# Patient Record
Sex: MALE | Race: WHITE | Employment: OTHER | URBAN - METROPOLITAN AREA
[De-identification: names, ages, dates, MRNs, and addresses within clinical notes are randomized per-mention and may not be internally consistent; named-entity substitution may affect disease eponyms.]

---

## 2017-03-15 ENCOUNTER — HOSPITAL ENCOUNTER (OUTPATIENT)
Dept: MRI IMAGING | Age: 67
Discharge: HOME | End: 2017-03-15
Attending: FAMILY MEDICINE
Payer: COMMERCIAL

## 2017-03-15 DIAGNOSIS — M54.10 RADICULAR PAIN OF RIGHT LOWER EXTREMITY: ICD-10-CM

## 2017-03-15 DIAGNOSIS — M51.36 LUMBAR DEGENERATIVE DISC DISEASE: ICD-10-CM

## 2017-03-15 PROCEDURE — 72148 MRI LUMBAR SPINE W/O DYE: CPT

## 2017-05-12 ENCOUNTER — HOSPITAL ENCOUNTER (OUTPATIENT)
Dept: MRI IMAGING | Age: 67
Discharge: HOME | End: 2017-05-12
Attending: ORTHOPAEDIC SURGERY
Payer: COMMERCIAL

## 2017-05-12 DIAGNOSIS — M75.21 BICIPITAL TENDINITIS OF SHOULDER, RIGHT: ICD-10-CM

## 2017-05-12 PROCEDURE — 73221 MRI JOINT UPR EXTREM W/O DYE: CPT

## 2017-06-28 ENCOUNTER — HOSPITAL ENCOUNTER (OUTPATIENT)
Dept: PREADMISSION TESTING | Age: 67
Discharge: HOME | End: 2017-06-28
Attending: ORTHOPAEDIC SURGERY
Payer: COMMERCIAL

## 2017-06-28 VITALS
BODY MASS INDEX: 27.11 KG/M2 | RESPIRATION RATE: 16 BRPM | HEART RATE: 65 BPM | DIASTOLIC BLOOD PRESSURE: 84 MMHG | WEIGHT: 200.18 LBS | SYSTOLIC BLOOD PRESSURE: 125 MMHG | HEIGHT: 72 IN | OXYGEN SATURATION: 96 % | TEMPERATURE: 98.1 F

## 2017-06-28 LAB
25(OH)D3 SERPL-MCNC: 22.7 NG/ML (ref 30–100)
ABO + RH BLD: NORMAL
ALBUMIN SERPL BCP-MCNC: 4.1 G/DL (ref 3.5–5)
ALBUMIN/GLOB SERPL: 1.4 {RATIO} (ref 1.1–2.2)
ALP SERPL-CCNC: 91 U/L (ref 45–117)
ALT SERPL-CCNC: 43 U/L (ref 12–78)
ANION GAP BLD CALC-SCNC: 8 MMOL/L (ref 5–15)
APPEARANCE UR: CLEAR
AST SERPL W P-5'-P-CCNC: 30 U/L (ref 15–37)
BACTERIA URNS QL MICRO: NEGATIVE /HPF
BILIRUB SERPL-MCNC: 0.6 MG/DL (ref 0.2–1)
BILIRUB UR QL: NEGATIVE
BLOOD GROUP ANTIBODIES SERPL: NORMAL
BUN SERPL-MCNC: 31 MG/DL (ref 6–20)
BUN/CREAT SERPL: 30 (ref 12–20)
CALCIUM SERPL-MCNC: 9.5 MG/DL (ref 8.5–10.1)
CHLORIDE SERPL-SCNC: 108 MMOL/L (ref 97–108)
CO2 SERPL-SCNC: 25 MMOL/L (ref 21–32)
COLOR UR: ABNORMAL
CREAT SERPL-MCNC: 1.02 MG/DL (ref 0.7–1.3)
EPITH CASTS URNS QL MICRO: ABNORMAL /LPF
EST. AVERAGE GLUCOSE BLD GHB EST-MCNC: 126 MG/DL
GLOBULIN SER CALC-MCNC: 2.9 G/DL (ref 2–4)
GLUCOSE SERPL-MCNC: 108 MG/DL (ref 65–100)
GLUCOSE UR STRIP.AUTO-MCNC: NEGATIVE MG/DL
HBA1C MFR BLD: 6 % (ref 4.2–6.3)
HGB UR QL STRIP: ABNORMAL
HYALINE CASTS URNS QL MICRO: ABNORMAL /LPF (ref 0–5)
INR PPP: 1.1 (ref 0.9–1.1)
KETONES UR QL STRIP.AUTO: NEGATIVE MG/DL
LEUKOCYTE ESTERASE UR QL STRIP.AUTO: NEGATIVE
NITRITE UR QL STRIP.AUTO: NEGATIVE
PH UR STRIP: 5.5 [PH] (ref 5–8)
POTASSIUM SERPL-SCNC: 4.1 MMOL/L (ref 3.5–5.1)
PREALB SERPL-MCNC: 28.9 MG/DL (ref 20–40)
PROT SERPL-MCNC: 7 G/DL (ref 6.4–8.2)
PROT UR STRIP-MCNC: NEGATIVE MG/DL
PROTHROMBIN TIME: 10.7 SEC (ref 9–11.1)
RBC #/AREA URNS HPF: ABNORMAL /HPF (ref 0–5)
SODIUM SERPL-SCNC: 141 MMOL/L (ref 136–145)
SP GR UR REFRACTOMETRY: 1.02 (ref 1–1.03)
SPECIMEN EXP DATE BLD: NORMAL
UA: UC IF INDICATED,UAUC: ABNORMAL
UROBILINOGEN UR QL STRIP.AUTO: 0.2 EU/DL (ref 0.2–1)
WBC URNS QL MICRO: ABNORMAL /HPF (ref 0–4)

## 2017-06-28 PROCEDURE — 36415 COLL VENOUS BLD VENIPUNCTURE: CPT | Performed by: ORTHOPAEDIC SURGERY

## 2017-06-28 PROCEDURE — 80053 COMPREHEN METABOLIC PANEL: CPT | Performed by: ORTHOPAEDIC SURGERY

## 2017-06-28 PROCEDURE — 83036 HEMOGLOBIN GLYCOSYLATED A1C: CPT | Performed by: ORTHOPAEDIC SURGERY

## 2017-06-28 PROCEDURE — 85610 PROTHROMBIN TIME: CPT | Performed by: ORTHOPAEDIC SURGERY

## 2017-06-28 PROCEDURE — 82306 VITAMIN D 25 HYDROXY: CPT | Performed by: ORTHOPAEDIC SURGERY

## 2017-06-28 PROCEDURE — 86900 BLOOD TYPING SEROLOGIC ABO: CPT | Performed by: ORTHOPAEDIC SURGERY

## 2017-06-28 PROCEDURE — 81001 URINALYSIS AUTO W/SCOPE: CPT | Performed by: ORTHOPAEDIC SURGERY

## 2017-06-28 PROCEDURE — 84134 ASSAY OF PREALBUMIN: CPT | Performed by: ORTHOPAEDIC SURGERY

## 2017-06-28 RX ORDER — GABAPENTIN 300 MG/1
300 CAPSULE ORAL 3 TIMES DAILY
COMMUNITY

## 2017-06-28 RX ORDER — TADALAFIL 5 MG/1
5 TABLET ORAL
Status: ON HOLD | COMMUNITY
End: 2017-07-11

## 2017-06-28 RX ORDER — SODIUM CHLORIDE, SODIUM LACTATE, POTASSIUM CHLORIDE, CALCIUM CHLORIDE 600; 310; 30; 20 MG/100ML; MG/100ML; MG/100ML; MG/100ML
25 INJECTION, SOLUTION INTRAVENOUS CONTINUOUS
Status: CANCELLED | OUTPATIENT
Start: 2017-07-11

## 2017-06-28 RX ORDER — DEXTROMETHORPHAN HYDROBROMIDE, GUAIFENESIN 5; 100 MG/5ML; MG/5ML
650 LIQUID ORAL
COMMUNITY
End: 2017-07-11

## 2017-06-28 RX ORDER — DICLOFENAC SODIUM 75 MG/1
75 TABLET, DELAYED RELEASE ORAL 2 TIMES DAILY
COMMUNITY

## 2017-06-28 RX ORDER — PREGABALIN 150 MG/1
150 CAPSULE ORAL ONCE
Status: CANCELLED | OUTPATIENT
Start: 2017-07-11 | End: 2017-07-11

## 2017-06-28 RX ORDER — ACETAMINOPHEN 500 MG
1000 TABLET ORAL ONCE
Status: CANCELLED | OUTPATIENT
Start: 2017-07-11 | End: 2017-07-11

## 2017-06-28 RX ORDER — NIACIN 250 MG
250 TABLET ORAL
COMMUNITY

## 2017-06-28 RX ORDER — BUSPIRONE HYDROCHLORIDE 5 MG/1
5 TABLET ORAL 2 TIMES DAILY
COMMUNITY

## 2017-06-28 RX ORDER — ATORVASTATIN CALCIUM 40 MG/1
20 TABLET, FILM COATED ORAL
COMMUNITY

## 2017-06-28 NOTE — PERIOP NOTES
Reviewed pre-op EKG (from [facility]) with Dr. Yosvany Bañuelos and notes from [facility] pre-op evaluation note. No further orders.

## 2017-06-28 NOTE — PERIOP NOTES
[facility]  Preoperative Instructions        Surgery Date 7/11/17          Time of Arrival 1445 pm   Contact # [phone]    1. On the day of your surgery, please report to the Surgical Services Registration Desk and sign in at your designated time. The Surgery Center is located to the right of the Emergency Room. 2. You must have someone with you to drive you home. You should not drive a car for 24 hours following surgery. Please make arrangements for a friend or family member to stay with you for the first 24 hours after your surgery. 3. Do not have anything to eat or drink (including water, gum, mints, coffee, juice) after midnight 7/10/17. This may not apply to medications prescribed by your physician. Please note special instructions, if applicable. If you are currently taking Plavix, Coumadin, or other blood-thinning agents, contact your surgeon for instructions. 4. We recommend you do not drink any alcoholic beverages for 24 hours before and after your surgery. 5. Have a list of all current medications, including vitamins, herbal supplements and any other over the counter medications. Stop all Aspirin and non-steroidal anti-inflammatory drugs (I.e. Advil, Aleve), as directed by your surgeon's office. Stop all vitamins and herbal supplements seven days prior to your surgery. 6. Wear comfortable clothes. Wear glasses instead of contacts. Do not bring any money or jewelry. Please bring picture ID, insurance card, and any prearranged co-payment or hospital payment. Do not wear make-up, particularly mascara the morning of your surgery. Do not wear nail polish, particularly if you are having foot /hand surgery. Wear your hair loose or down, no ponytails, buns, renetta pins or clips. All body piercings must be removed.   Please shower with antibacterial soap for three consecutive days before and on the morning of surgery, but do not apply any lotions, powders or deodorants after the shower on the day of surgery. Please use a fresh towels after each shower. Please sleep in clean clothes and change bed linens the night before surgery. Please do not shave for 48 hours prior to surgery. Shaving of the face is acceptable. 7. You should understand that if you do not follow these instructions your surgery may be cancelled. If your physical condition changes (I.e. fever, cold or flu) please contact your surgeon as soon as possible. 8. It is important that you be on time. If a situation occurs where you may be late, please call [phone] (OR Holding Area). 9. If you have any questions and or problems, please call [phone] (Pre-admission Testing). 10. Your surgery time may be subject to change. You will receive a phone call the evening prior if your time changes. 11.  If having outpatient surgery, you must have someone to drive you here, stay with you during the duration of your stay, and to drive you home at time of discharge. Special Instructions: Follow instruction sheet for antibacterial soap and hibicleanse. MEDICATIONS TO TAKE THE MORNING OF SURGERY WITH A SIP OF WATER: Tylenol as needed, flonase as needed, gabapentin      I understand a pre-operative phone call will be made to verify my surgery time. In the event that I am not available, I give permission for a message to be left on my answering service and/or with another person?   yes         ___________________      __________   _________    (Signature of Patient)             (Witness)                (Date and Time)

## 2017-06-29 LAB
BACTERIA SPEC CULT: NORMAL
BACTERIA SPEC CULT: NORMAL
SERVICE CMNT-IMP: NORMAL

## 2017-07-06 RX ORDER — CHOLECALCIFEROL (VITAMIN D3) 125 MCG
CAPSULE ORAL DAILY
COMMUNITY

## 2017-07-06 NOTE — PERIOP NOTES
Follow up call to Dr. Lilly Mora concerning faxed labs (urine,BUN)--vitamin d treated with 2000 units daily--no further orders.

## 2017-07-11 ENCOUNTER — APPOINTMENT (OUTPATIENT)
Dept: GENERAL RADIOLOGY | Age: 67
End: 2017-07-11
Attending: ORTHOPAEDIC SURGERY
Payer: COMMERCIAL

## 2017-07-11 ENCOUNTER — ANESTHESIA (OUTPATIENT)
Dept: SURGERY | Age: 67
End: 2017-07-11
Payer: COMMERCIAL

## 2017-07-11 ENCOUNTER — ANESTHESIA EVENT (OUTPATIENT)
Dept: SURGERY | Age: 67
End: 2017-07-11
Payer: COMMERCIAL

## 2017-07-11 ENCOUNTER — HOSPITAL ENCOUNTER (OUTPATIENT)
Age: 67
Setting detail: OBSERVATION
Discharge: HOME | End: 2017-07-11
Attending: ORTHOPAEDIC SURGERY | Admitting: ORTHOPAEDIC SURGERY
Payer: COMMERCIAL

## 2017-07-11 VITALS
DIASTOLIC BLOOD PRESSURE: 79 MMHG | OXYGEN SATURATION: 96 % | RESPIRATION RATE: 16 BRPM | HEIGHT: 72 IN | TEMPERATURE: 98.3 F | HEART RATE: 96 BPM | SYSTOLIC BLOOD PRESSURE: 118 MMHG | WEIGHT: 197.09 LBS | BODY MASS INDEX: 26.7 KG/M2

## 2017-07-11 PROBLEM — M48.062 SPINAL STENOSIS OF LUMBAR REGION WITH NEUROGENIC CLAUDICATION: Status: ACTIVE | Noted: 2017-07-11

## 2017-07-11 PROCEDURE — 77030020061 HC IV BLD WRMR ADMIN SET 3M -B: Performed by: ANESTHESIOLOGY

## 2017-07-11 PROCEDURE — 77030003666 HC NDL SPINAL BD -A: Performed by: ORTHOPAEDIC SURGERY

## 2017-07-11 PROCEDURE — 77030033138 HC SUT PGA STRATFX J&J -B: Performed by: ORTHOPAEDIC SURGERY

## 2017-07-11 PROCEDURE — 74011000250 HC RX REV CODE- 250

## 2017-07-11 PROCEDURE — 77030018836 HC SOL IRR NACL ICUM -A: Performed by: ORTHOPAEDIC SURGERY

## 2017-07-11 PROCEDURE — 77030019908 HC STETH ESOPH SIMS -A: Performed by: ANESTHESIOLOGY

## 2017-07-11 PROCEDURE — 76010000162 HC OR TIME 1.5 TO 2 HR INTENSV-TIER 1: Performed by: ORTHOPAEDIC SURGERY

## 2017-07-11 PROCEDURE — 74011250636 HC RX REV CODE- 250/636: Performed by: ORTHOPAEDIC SURGERY

## 2017-07-11 PROCEDURE — 77030003029 HC SUT VCRL J&J -B: Performed by: ORTHOPAEDIC SURGERY

## 2017-07-11 PROCEDURE — 77030029099 HC BN WAX SSPC -A: Performed by: ORTHOPAEDIC SURGERY

## 2017-07-11 PROCEDURE — 77030008467 HC STPLR SKN COVD -B: Performed by: ORTHOPAEDIC SURGERY

## 2017-07-11 PROCEDURE — 77030012961 HC IRR KT CYSTO/TUR ICUM -A: Performed by: ORTHOPAEDIC SURGERY

## 2017-07-11 PROCEDURE — 77030034849: Performed by: ORTHOPAEDIC SURGERY

## 2017-07-11 PROCEDURE — 77030014650 HC SEAL MTRX FLOSEL BAXT -C: Performed by: ORTHOPAEDIC SURGERY

## 2017-07-11 PROCEDURE — 74011250636 HC RX REV CODE- 250/636: Performed by: ANESTHESIOLOGY

## 2017-07-11 PROCEDURE — 77030031139 HC SUT VCRL2 J&J -A: Performed by: ORTHOPAEDIC SURGERY

## 2017-07-11 PROCEDURE — 99218 HC RM OBSERVATION: CPT

## 2017-07-11 PROCEDURE — 74011250636 HC RX REV CODE- 250/636

## 2017-07-11 PROCEDURE — 77030022704 HC SUT VLOC COVD -B: Performed by: ORTHOPAEDIC SURGERY

## 2017-07-11 PROCEDURE — 72100 X-RAY EXAM L-S SPINE 2/3 VWS: CPT

## 2017-07-11 PROCEDURE — 77030016570 HC BLNKT BAIR HGGR 3M -B: Performed by: ORTHOPAEDIC SURGERY

## 2017-07-11 PROCEDURE — 76210000006 HC OR PH I REC 0.5 TO 1 HR: Performed by: ORTHOPAEDIC SURGERY

## 2017-07-11 PROCEDURE — 74011250637 HC RX REV CODE- 250/637: Performed by: ORTHOPAEDIC SURGERY

## 2017-07-11 PROCEDURE — 76000 FLUOROSCOPY <1 HR PHYS/QHP: CPT

## 2017-07-11 PROCEDURE — 77030020782 HC GWN BAIR PAWS FLX 3M -B

## 2017-07-11 PROCEDURE — 74011000250 HC RX REV CODE- 250: Performed by: ORTHOPAEDIC SURGERY

## 2017-07-11 PROCEDURE — 77030021678 HC GLIDESCP STAT DISP VERT -B: Performed by: ANESTHESIOLOGY

## 2017-07-11 PROCEDURE — 77030011266 HC ELECTRD BLD INSL COVD -A: Performed by: ORTHOPAEDIC SURGERY

## 2017-07-11 PROCEDURE — 77030008771 HC TU NG SALEM SUMP -A: Performed by: ANESTHESIOLOGY

## 2017-07-11 PROCEDURE — 77030018846 HC SOL IRR STRL H20 ICUM -A: Performed by: ORTHOPAEDIC SURGERY

## 2017-07-11 PROCEDURE — 76001 XR FLUOROSCOPY OVER 60 MINUTES: CPT

## 2017-07-11 PROCEDURE — 77030034479 HC ADH SKN CLSR PRINEO J&J -B: Performed by: ORTHOPAEDIC SURGERY

## 2017-07-11 PROCEDURE — 77030004391 HC BUR FLUT MEDT -C: Performed by: ORTHOPAEDIC SURGERY

## 2017-07-11 PROCEDURE — 77010033678 HC OXYGEN DAILY

## 2017-07-11 PROCEDURE — 77030013079 HC BLNKT BAIR HGGR 3M -A: Performed by: ANESTHESIOLOGY

## 2017-07-11 PROCEDURE — 74011000258 HC RX REV CODE- 258: Performed by: ORTHOPAEDIC SURGERY

## 2017-07-11 PROCEDURE — 77030008684 HC TU ET CUF COVD -B: Performed by: ANESTHESIOLOGY

## 2017-07-11 PROCEDURE — 77030032490 HC SLV COMPR SCD KNE COVD -B: Performed by: ORTHOPAEDIC SURGERY

## 2017-07-11 PROCEDURE — 76060000034 HC ANESTHESIA 1.5 TO 2 HR: Performed by: ORTHOPAEDIC SURGERY

## 2017-07-11 PROCEDURE — 74011000272 HC RX REV CODE- 272: Performed by: ORTHOPAEDIC SURGERY

## 2017-07-11 RX ORDER — MIDAZOLAM HYDROCHLORIDE 1 MG/ML
1 INJECTION, SOLUTION INTRAMUSCULAR; INTRAVENOUS AS NEEDED
Status: DISCONTINUED | OUTPATIENT
Start: 2017-07-11 | End: 2017-07-11 | Stop reason: HOSPADM

## 2017-07-11 RX ORDER — PREGABALIN 75 MG/1
150 CAPSULE ORAL ONCE
Status: COMPLETED | OUTPATIENT
Start: 2017-07-11 | End: 2017-07-11

## 2017-07-11 RX ORDER — ACETAMINOPHEN 500 MG
1000 TABLET ORAL ONCE
Status: COMPLETED | OUTPATIENT
Start: 2017-07-11 | End: 2017-07-11

## 2017-07-11 RX ORDER — GUAIFENESIN 100 MG/5ML
81 LIQUID (ML) ORAL DAILY
Status: DISCONTINUED | OUTPATIENT
Start: 2017-07-12 | End: 2017-07-11 | Stop reason: HOSPADM

## 2017-07-11 RX ORDER — SODIUM CHLORIDE 0.9 % (FLUSH) 0.9 %
5-10 SYRINGE (ML) INJECTION AS NEEDED
Status: DISCONTINUED | OUTPATIENT
Start: 2017-07-11 | End: 2017-07-11 | Stop reason: HOSPADM

## 2017-07-11 RX ORDER — PHENYLEPHRINE HCL IN 0.9% NACL 0.4MG/10ML
SYRINGE (ML) INTRAVENOUS AS NEEDED
Status: DISCONTINUED | OUTPATIENT
Start: 2017-07-11 | End: 2017-07-11 | Stop reason: HOSPADM

## 2017-07-11 RX ORDER — ROCURONIUM BROMIDE 10 MG/ML
INJECTION, SOLUTION INTRAVENOUS AS NEEDED
Status: DISCONTINUED | OUTPATIENT
Start: 2017-07-11 | End: 2017-07-11 | Stop reason: HOSPADM

## 2017-07-11 RX ORDER — ACETAMINOPHEN 325 MG/1
650 TABLET ORAL ONCE
Status: DISCONTINUED | OUTPATIENT
Start: 2017-07-11 | End: 2017-07-11 | Stop reason: HOSPADM

## 2017-07-11 RX ORDER — FINASTERIDE 5 MG/1
5 TABLET, FILM COATED ORAL
Status: DISCONTINUED | OUTPATIENT
Start: 2017-07-11 | End: 2017-07-11 | Stop reason: HOSPADM

## 2017-07-11 RX ORDER — BUSPIRONE HYDROCHLORIDE 5 MG/1
5 TABLET ORAL 2 TIMES DAILY
Status: DISCONTINUED | OUTPATIENT
Start: 2017-07-11 | End: 2017-07-11 | Stop reason: HOSPADM

## 2017-07-11 RX ORDER — FENTANYL CITRATE 50 UG/ML
25 INJECTION, SOLUTION INTRAMUSCULAR; INTRAVENOUS
Status: COMPLETED | OUTPATIENT
Start: 2017-07-11 | End: 2017-07-11

## 2017-07-11 RX ORDER — TADALAFIL 5 MG/1
5 TABLET ORAL
Status: SHIPPED | COMMUNITY
Start: 2017-07-11

## 2017-07-11 RX ORDER — LORATADINE 10 MG/1
10 TABLET ORAL DAILY
Status: DISCONTINUED | OUTPATIENT
Start: 2017-07-12 | End: 2017-07-11 | Stop reason: HOSPADM

## 2017-07-11 RX ORDER — NALOXONE HYDROCHLORIDE 0.4 MG/ML
0.4 INJECTION, SOLUTION INTRAMUSCULAR; INTRAVENOUS; SUBCUTANEOUS AS NEEDED
Status: DISCONTINUED | OUTPATIENT
Start: 2017-07-11 | End: 2017-07-11 | Stop reason: HOSPADM

## 2017-07-11 RX ORDER — ONDANSETRON 2 MG/ML
INJECTION INTRAMUSCULAR; INTRAVENOUS AS NEEDED
Status: DISCONTINUED | OUTPATIENT
Start: 2017-07-11 | End: 2017-07-11 | Stop reason: HOSPADM

## 2017-07-11 RX ORDER — POLYETHYLENE GLYCOL 3350 17 G/17G
17 POWDER, FOR SOLUTION ORAL DAILY
Qty: 255 G | Refills: 0 | Status: SHIPPED | OUTPATIENT
Start: 2017-07-11 | End: 2017-07-26

## 2017-07-11 RX ORDER — ONDANSETRON 2 MG/ML
4 INJECTION INTRAMUSCULAR; INTRAVENOUS AS NEEDED
Status: DISCONTINUED | OUTPATIENT
Start: 2017-07-11 | End: 2017-07-11 | Stop reason: HOSPADM

## 2017-07-11 RX ORDER — SODIUM CHLORIDE 9 MG/ML
25 INJECTION, SOLUTION INTRAVENOUS CONTINUOUS
Status: DISCONTINUED | OUTPATIENT
Start: 2017-07-11 | End: 2017-07-11 | Stop reason: HOSPADM

## 2017-07-11 RX ORDER — MIDAZOLAM HYDROCHLORIDE 1 MG/ML
0.5 INJECTION, SOLUTION INTRAMUSCULAR; INTRAVENOUS
Status: DISCONTINUED | OUTPATIENT
Start: 2017-07-11 | End: 2017-07-11 | Stop reason: HOSPADM

## 2017-07-11 RX ORDER — ACETAMINOPHEN 500 MG
1000 TABLET ORAL EVERY 6 HOURS
Qty: 112 TAB | Refills: 0 | Status: SHIPPED | OUTPATIENT
Start: 2017-07-11 | End: 2017-07-25

## 2017-07-11 RX ORDER — LIDOCAINE HYDROCHLORIDE 20 MG/ML
INJECTION, SOLUTION EPIDURAL; INFILTRATION; INTRACAUDAL; PERINEURAL AS NEEDED
Status: DISCONTINUED | OUTPATIENT
Start: 2017-07-11 | End: 2017-07-11 | Stop reason: HOSPADM

## 2017-07-11 RX ORDER — MELATONIN
2000 DAILY
Status: DISCONTINUED | OUTPATIENT
Start: 2017-07-12 | End: 2017-07-11 | Stop reason: HOSPADM

## 2017-07-11 RX ORDER — SODIUM CHLORIDE 0.9 % (FLUSH) 0.9 %
5-10 SYRINGE (ML) INJECTION EVERY 8 HOURS
Status: DISCONTINUED | OUTPATIENT
Start: 2017-07-12 | End: 2017-07-11 | Stop reason: HOSPADM

## 2017-07-11 RX ORDER — KETOROLAC TROMETHAMINE 30 MG/ML
15 INJECTION, SOLUTION INTRAMUSCULAR; INTRAVENOUS EVERY 6 HOURS
Status: DISCONTINUED | OUTPATIENT
Start: 2017-07-11 | End: 2017-07-11 | Stop reason: HOSPADM

## 2017-07-11 RX ORDER — SODIUM CHLORIDE, SODIUM LACTATE, POTASSIUM CHLORIDE, CALCIUM CHLORIDE 600; 310; 30; 20 MG/100ML; MG/100ML; MG/100ML; MG/100ML
25 INJECTION, SOLUTION INTRAVENOUS CONTINUOUS
Status: DISCONTINUED | OUTPATIENT
Start: 2017-07-11 | End: 2017-07-11 | Stop reason: HOSPADM

## 2017-07-11 RX ORDER — MIDAZOLAM HYDROCHLORIDE 1 MG/ML
INJECTION, SOLUTION INTRAMUSCULAR; INTRAVENOUS AS NEEDED
Status: DISCONTINUED | OUTPATIENT
Start: 2017-07-11 | End: 2017-07-11 | Stop reason: HOSPADM

## 2017-07-11 RX ORDER — OXYCODONE HYDROCHLORIDE 5 MG/1
5-10 TABLET ORAL
Qty: 80 TAB | Refills: 0 | Status: SHIPPED | OUTPATIENT
Start: 2017-07-11

## 2017-07-11 RX ORDER — POLYETHYLENE GLYCOL 3350 17 G/17G
17 POWDER, FOR SOLUTION ORAL DAILY
Status: DISCONTINUED | OUTPATIENT
Start: 2017-07-12 | End: 2017-07-11 | Stop reason: HOSPADM

## 2017-07-11 RX ORDER — HYDROMORPHONE HYDROCHLORIDE 1 MG/ML
1 INJECTION, SOLUTION INTRAMUSCULAR; INTRAVENOUS; SUBCUTANEOUS
Status: DISCONTINUED | OUTPATIENT
Start: 2017-07-11 | End: 2017-07-11 | Stop reason: HOSPADM

## 2017-07-11 RX ORDER — HYDROMORPHONE HYDROCHLORIDE 1 MG/ML
0.2 INJECTION, SOLUTION INTRAMUSCULAR; INTRAVENOUS; SUBCUTANEOUS
Status: DISCONTINUED | OUTPATIENT
Start: 2017-07-11 | End: 2017-07-11 | Stop reason: HOSPADM

## 2017-07-11 RX ORDER — HYDROMORPHONE HYDROCHLORIDE 2 MG/ML
INJECTION, SOLUTION INTRAMUSCULAR; INTRAVENOUS; SUBCUTANEOUS AS NEEDED
Status: DISCONTINUED | OUTPATIENT
Start: 2017-07-11 | End: 2017-07-11 | Stop reason: HOSPADM

## 2017-07-11 RX ORDER — FENTANYL CITRATE 50 UG/ML
INJECTION, SOLUTION INTRAMUSCULAR; INTRAVENOUS AS NEEDED
Status: DISCONTINUED | OUTPATIENT
Start: 2017-07-11 | End: 2017-07-11 | Stop reason: HOSPADM

## 2017-07-11 RX ORDER — HYDROXYZINE HYDROCHLORIDE 10 MG/1
10 TABLET, FILM COATED ORAL
Status: DISCONTINUED | OUTPATIENT
Start: 2017-07-11 | End: 2017-07-11 | Stop reason: HOSPADM

## 2017-07-11 RX ORDER — GABAPENTIN 300 MG/1
300 CAPSULE ORAL 3 TIMES DAILY
Status: DISCONTINUED | OUTPATIENT
Start: 2017-07-11 | End: 2017-07-11 | Stop reason: HOSPADM

## 2017-07-11 RX ORDER — ATORVASTATIN CALCIUM 20 MG/1
20 TABLET, FILM COATED ORAL
Status: DISCONTINUED | OUTPATIENT
Start: 2017-07-11 | End: 2017-07-11 | Stop reason: HOSPADM

## 2017-07-11 RX ORDER — SODIUM CHLORIDE, SODIUM LACTATE, POTASSIUM CHLORIDE, CALCIUM CHLORIDE 600; 310; 30; 20 MG/100ML; MG/100ML; MG/100ML; MG/100ML
125 INJECTION, SOLUTION INTRAVENOUS CONTINUOUS
Status: DISCONTINUED | OUTPATIENT
Start: 2017-07-11 | End: 2017-07-11 | Stop reason: HOSPADM

## 2017-07-11 RX ORDER — FAMOTIDINE 20 MG/1
20 TABLET, FILM COATED ORAL 2 TIMES DAILY
Status: DISCONTINUED | OUTPATIENT
Start: 2017-07-11 | End: 2017-07-11 | Stop reason: HOSPADM

## 2017-07-11 RX ORDER — ACETAMINOPHEN 10 MG/ML
INJECTION, SOLUTION INTRAVENOUS AS NEEDED
Status: DISCONTINUED | OUTPATIENT
Start: 2017-07-11 | End: 2017-07-11

## 2017-07-11 RX ORDER — FENTANYL CITRATE 50 UG/ML
50 INJECTION, SOLUTION INTRAMUSCULAR; INTRAVENOUS AS NEEDED
Status: DISCONTINUED | OUTPATIENT
Start: 2017-07-11 | End: 2017-07-11 | Stop reason: HOSPADM

## 2017-07-11 RX ORDER — NEOSTIGMINE METHYLSULFATE 1 MG/ML
INJECTION INTRAVENOUS AS NEEDED
Status: DISCONTINUED | OUTPATIENT
Start: 2017-07-11 | End: 2017-07-11 | Stop reason: HOSPADM

## 2017-07-11 RX ORDER — CEFAZOLIN SODIUM IN 0.9 % NACL 2 G/100 ML
2 PLASTIC BAG, INJECTION (ML) INTRAVENOUS EVERY 8 HOURS
Status: DISCONTINUED | OUTPATIENT
Start: 2017-07-11 | End: 2017-07-11 | Stop reason: HOSPADM

## 2017-07-11 RX ORDER — OXYCODONE HYDROCHLORIDE 5 MG/1
5 TABLET ORAL
Status: DISCONTINUED | OUTPATIENT
Start: 2017-07-11 | End: 2017-07-11 | Stop reason: HOSPADM

## 2017-07-11 RX ORDER — OXYCODONE HYDROCHLORIDE 5 MG/1
5 TABLET ORAL AS NEEDED
Status: DISCONTINUED | OUTPATIENT
Start: 2017-07-11 | End: 2017-07-11 | Stop reason: HOSPADM

## 2017-07-11 RX ORDER — PROPOFOL 10 MG/ML
INJECTION, EMULSION INTRAVENOUS AS NEEDED
Status: DISCONTINUED | OUTPATIENT
Start: 2017-07-11 | End: 2017-07-11 | Stop reason: HOSPADM

## 2017-07-11 RX ORDER — MORPHINE SULFATE 10 MG/ML
2 INJECTION, SOLUTION INTRAMUSCULAR; INTRAVENOUS
Status: DISCONTINUED | OUTPATIENT
Start: 2017-07-11 | End: 2017-07-11 | Stop reason: HOSPADM

## 2017-07-11 RX ORDER — ONDANSETRON 2 MG/ML
4 INJECTION INTRAMUSCULAR; INTRAVENOUS
Status: DISCONTINUED | OUTPATIENT
Start: 2017-07-11 | End: 2017-07-11 | Stop reason: HOSPADM

## 2017-07-11 RX ORDER — SODIUM CHLORIDE 0.9 % (FLUSH) 0.9 %
5-10 SYRINGE (ML) INJECTION EVERY 8 HOURS
Status: DISCONTINUED | OUTPATIENT
Start: 2017-07-11 | End: 2017-07-11 | Stop reason: HOSPADM

## 2017-07-11 RX ORDER — ACETAMINOPHEN 500 MG
1000 TABLET ORAL EVERY 6 HOURS
Status: DISCONTINUED | OUTPATIENT
Start: 2017-07-11 | End: 2017-07-11 | Stop reason: HOSPADM

## 2017-07-11 RX ORDER — DEXAMETHASONE SODIUM PHOSPHATE 4 MG/ML
INJECTION, SOLUTION INTRA-ARTICULAR; INTRALESIONAL; INTRAMUSCULAR; INTRAVENOUS; SOFT TISSUE AS NEEDED
Status: DISCONTINUED | OUTPATIENT
Start: 2017-07-11 | End: 2017-07-11 | Stop reason: HOSPADM

## 2017-07-11 RX ORDER — FLUTICASONE PROPIONATE 50 MCG
1 SPRAY, SUSPENSION (ML) NASAL DAILY
Status: DISCONTINUED | OUTPATIENT
Start: 2017-07-12 | End: 2017-07-11 | Stop reason: HOSPADM

## 2017-07-11 RX ORDER — DIAZEPAM 5 MG/1
5 TABLET ORAL
Status: DISCONTINUED | OUTPATIENT
Start: 2017-07-11 | End: 2017-07-11 | Stop reason: HOSPADM

## 2017-07-11 RX ORDER — DICLOFENAC SODIUM 75 MG/1
75 TABLET, DELAYED RELEASE ORAL 2 TIMES DAILY
Status: DISCONTINUED | OUTPATIENT
Start: 2017-07-12 | End: 2017-07-11 | Stop reason: HOSPADM

## 2017-07-11 RX ORDER — OXYCODONE HYDROCHLORIDE 5 MG/1
10-15 TABLET ORAL
Status: DISCONTINUED | OUTPATIENT
Start: 2017-07-11 | End: 2017-07-11 | Stop reason: HOSPADM

## 2017-07-11 RX ORDER — AMOXICILLIN 250 MG
1 CAPSULE ORAL 2 TIMES DAILY
Status: DISCONTINUED | OUTPATIENT
Start: 2017-07-11 | End: 2017-07-11 | Stop reason: HOSPADM

## 2017-07-11 RX ORDER — DIPHENHYDRAMINE HYDROCHLORIDE 50 MG/ML
12.5 INJECTION, SOLUTION INTRAMUSCULAR; INTRAVENOUS AS NEEDED
Status: DISCONTINUED | OUTPATIENT
Start: 2017-07-11 | End: 2017-07-11 | Stop reason: HOSPADM

## 2017-07-11 RX ORDER — GLYCOPYRROLATE 0.2 MG/ML
INJECTION INTRAMUSCULAR; INTRAVENOUS AS NEEDED
Status: DISCONTINUED | OUTPATIENT
Start: 2017-07-11 | End: 2017-07-11 | Stop reason: HOSPADM

## 2017-07-11 RX ORDER — FACIAL-BODY WIPES
10 EACH TOPICAL DAILY PRN
Status: DISCONTINUED | OUTPATIENT
Start: 2017-07-13 | End: 2017-07-11 | Stop reason: HOSPADM

## 2017-07-11 RX ORDER — SODIUM CHLORIDE 9 MG/ML
125 INJECTION, SOLUTION INTRAVENOUS CONTINUOUS
Status: DISCONTINUED | OUTPATIENT
Start: 2017-07-11 | End: 2017-07-11 | Stop reason: HOSPADM

## 2017-07-11 RX ORDER — NIACIN 250 MG
250 TABLET ORAL
Status: DISCONTINUED | OUTPATIENT
Start: 2017-07-12 | End: 2017-07-11 | Stop reason: HOSPADM

## 2017-07-11 RX ORDER — LIDOCAINE HYDROCHLORIDE 10 MG/ML
0.1 INJECTION, SOLUTION EPIDURAL; INFILTRATION; INTRACAUDAL; PERINEURAL AS NEEDED
Status: DISCONTINUED | OUTPATIENT
Start: 2017-07-11 | End: 2017-07-11 | Stop reason: HOSPADM

## 2017-07-11 RX ORDER — DEXAMETHASONE SODIUM PHOSPHATE 4 MG/ML
10 INJECTION, SOLUTION INTRA-ARTICULAR; INTRALESIONAL; INTRAMUSCULAR; INTRAVENOUS; SOFT TISSUE ONCE
Status: DISCONTINUED | OUTPATIENT
Start: 2017-07-12 | End: 2017-07-11 | Stop reason: HOSPADM

## 2017-07-11 RX ADMIN — ROCURONIUM BROMIDE 50 MG: 10 INJECTION, SOLUTION INTRAVENOUS at 12:53

## 2017-07-11 RX ADMIN — HYDROMORPHONE HYDROCHLORIDE 0.5 MG: 2 INJECTION, SOLUTION INTRAMUSCULAR; INTRAVENOUS; SUBCUTANEOUS at 14:00

## 2017-07-11 RX ADMIN — OXYCODONE HYDROCHLORIDE 5 MG: 5 TABLET ORAL at 17:16

## 2017-07-11 RX ADMIN — ROPIVACAINE HYDROCHLORIDE 100 ML: 5 INJECTION, SOLUTION EPIDURAL; INFILTRATION; PERINEURAL at 14:00

## 2017-07-11 RX ADMIN — FENTANYL CITRATE 25 MCG: 50 INJECTION, SOLUTION INTRAMUSCULAR; INTRAVENOUS at 15:00

## 2017-07-11 RX ADMIN — HYDROMORPHONE HYDROCHLORIDE 0.5 MG: 2 INJECTION, SOLUTION INTRAMUSCULAR; INTRAVENOUS; SUBCUTANEOUS at 13:45

## 2017-07-11 RX ADMIN — FENTANYL CITRATE 25 MCG: 50 INJECTION, SOLUTION INTRAMUSCULAR; INTRAVENOUS at 15:10

## 2017-07-11 RX ADMIN — GLYCOPYRROLATE 0.4 MG: 0.2 INJECTION INTRAMUSCULAR; INTRAVENOUS at 14:25

## 2017-07-11 RX ADMIN — DEXAMETHASONE SODIUM PHOSPHATE 10 MG: 4 INJECTION, SOLUTION INTRA-ARTICULAR; INTRALESIONAL; INTRAMUSCULAR; INTRAVENOUS; SOFT TISSUE at 13:17

## 2017-07-11 RX ADMIN — FENTANYL CITRATE 25 MCG: 50 INJECTION, SOLUTION INTRAMUSCULAR; INTRAVENOUS at 15:20

## 2017-07-11 RX ADMIN — HYDROMORPHONE HYDROCHLORIDE 0.5 MG: 2 INJECTION, SOLUTION INTRAMUSCULAR; INTRAVENOUS; SUBCUTANEOUS at 14:29

## 2017-07-11 RX ADMIN — MIDAZOLAM HYDROCHLORIDE 2 MG: 1 INJECTION, SOLUTION INTRAMUSCULAR; INTRAVENOUS at 12:44

## 2017-07-11 RX ADMIN — SODIUM CHLORIDE, SODIUM LACTATE, POTASSIUM CHLORIDE, AND CALCIUM CHLORIDE 25 ML/HR: 600; 310; 30; 20 INJECTION, SOLUTION INTRAVENOUS at 11:26

## 2017-07-11 RX ADMIN — NEOSTIGMINE METHYLSULFATE 2 MG: 1 INJECTION INTRAVENOUS at 14:25

## 2017-07-11 RX ADMIN — ACETAMINOPHEN 1000 MG: 500 TABLET ORAL at 11:28

## 2017-07-11 RX ADMIN — LIDOCAINE HYDROCHLORIDE 60 MG: 20 INJECTION, SOLUTION EPIDURAL; INFILTRATION; INTRACAUDAL; PERINEURAL at 12:53

## 2017-07-11 RX ADMIN — FENTANYL CITRATE 25 MCG: 50 INJECTION, SOLUTION INTRAMUSCULAR; INTRAVENOUS at 14:55

## 2017-07-11 RX ADMIN — HYDROMORPHONE HYDROCHLORIDE 0.5 MG: 2 INJECTION, SOLUTION INTRAMUSCULAR; INTRAVENOUS; SUBCUTANEOUS at 14:18

## 2017-07-11 RX ADMIN — PROPOFOL 150 MG: 10 INJECTION, EMULSION INTRAVENOUS at 12:53

## 2017-07-11 RX ADMIN — PREGABALIN 150 MG: 75 CAPSULE ORAL at 11:28

## 2017-07-11 RX ADMIN — SODIUM CHLORIDE, SODIUM LACTATE, POTASSIUM CHLORIDE, AND CALCIUM CHLORIDE: 600; 310; 30; 20 INJECTION, SOLUTION INTRAVENOUS at 13:59

## 2017-07-11 RX ADMIN — SODIUM CHLORIDE 125 ML/HR: 900 INJECTION, SOLUTION INTRAVENOUS at 15:00

## 2017-07-11 RX ADMIN — FENTANYL CITRATE 50 MCG: 50 INJECTION, SOLUTION INTRAMUSCULAR; INTRAVENOUS at 13:00

## 2017-07-11 RX ADMIN — FENTANYL CITRATE 50 MCG: 50 INJECTION, SOLUTION INTRAMUSCULAR; INTRAVENOUS at 12:53

## 2017-07-11 RX ADMIN — ACETAMINOPHEN 1000 MG: 500 TABLET ORAL at 17:14

## 2017-07-11 RX ADMIN — Medication 40 MCG: at 13:22

## 2017-07-11 RX ADMIN — ONDANSETRON 4 MG: 2 INJECTION INTRAMUSCULAR; INTRAVENOUS at 13:17

## 2017-07-11 RX ADMIN — CEFAZOLIN 2 G: 1 INJECTION, POWDER, FOR SOLUTION INTRAMUSCULAR; INTRAVENOUS; PARENTERAL at 13:17

## 2017-07-11 NOTE — PERIOP NOTES
Patient: Beth Harris MRN: [id number]  SSN: [id number]   YOB: 1950  Age: 74 y.o. Sex: male     Patient is status post Procedure(s):  RIGHT L4-5 DECOMPRESSION, L5-S1 DISCECTOMY. Surgeon(s) and Role:     * Chaya Hansen MD - Primary    Local/Dose/Irrigation:  100 ML DR. TONG LOCAL INJECTION SOLUTION                  Peripheral IV 07/11/17 Left Arm (Active)   Site Assessment Clean, dry, & intact 7/11/2017 11:26 AM   Phlebitis Assessment 0 7/11/2017 11:26 AM   Infiltration Assessment 0 7/11/2017 11:26 AM   Dressing Status Clean, dry, & intact 7/11/2017 11:26 AM   Dressing Type Tape;Transparent 7/11/2017 11:26 AM   Hub Color/Line Status Pink; Infusing 7/11/2017 11:26 AM            Airway - Endotracheal Tube 07/11/17 Oral (Active)   Line Blayne Lips 7/11/2017 12:00 AM                   Dressing/Packing:  Wound Back Lower-DRESSING TYPE: Sutures; Topical skin adhesive/glue (DERMABOND PRINEO) (07/11/17 4783)  Splint/Cast:  ]    Other:  BO CATHETER REMOVED AT END OF CASE.

## 2017-07-11 NOTE — PERIOP NOTES
Report to SMAEER Hassan RN per [employer] [facility] [employer] for lunch relief.

## 2017-07-11 NOTE — IP AVS SNAPSHOT
[address] [facility] 
[phone] Patient: Renay Tanner MRN: [id number] You are allergic to the following No active allergies Recent Documentation Height Weight BMI Smoking Status 1.829 m 89.4 kg 26.73 kg/m2 Never Smoker Emergency Contacts Name Discharge Info Relation Home Work Mobile Virgie Knight DISCHARGE CAREGIVER [3] Friend [5] [phone] About your hospitalization You were admitted on:  2017 You last received care in the:  [facility] You were discharged on:  2017 Unit phone number:  [phone] Why you were hospitalized Your primary diagnosis was:  Not on File Your diagnoses also included:  Spinal Stenosis Of Lumbar Region With Neurogenic Claudication Providers Seen During Your Hospitalizations Provider Role Specialty Primary office phone Liam Domingo MD Attending Provider Orthopedic Surgery [phone] Your Primary Care Physician (PCP) Primary Care Physician Office Phone Office Fax [address] [employer] [phone] Follow-up Information Follow up With Details Comments Contact Info Liam Domingo MD Schedule an appointment as soon as possible for a visit in 2 weeks  [address]  [address] [facility] 
[phone] Current Discharge Medication List  
  
START taking these medications Dose & Instructions Dispensing Information Comments Morning Noon Evening Bedtime  
 acetaminophen 500 mg tablet Commonly known as:  TYLENOL Replaces:  acetaminophen 650 mg CR tablet Your last dose was: Your next dose is:    
   
   
 Dose:  1000 mg Take 2 Tabs by mouth every six (6) hours for 14 days. Quantity:  112 Tab Refills:  0 oxyCODONE IR 5 mg immediate release tablet Commonly known as:  Jaylyn Armendariz Your last dose was: Your next dose is:    
   
   
 Dose:  5-10 mg Take 1-2 Tabs by mouth every three (3) hours as needed. Max Daily Amount: 80 mg.  
 Quantity:  80 Tab Refills:  0  
     
   
   
   
  
 polyethylene glycol 17 gram/dose powder Commonly known as:  Joesph Cavanaugh Your last dose was: Your next dose is:    
   
   
 Dose:  17 g Take 17 g by mouth daily for 15 days. Quantity:  255 g Refills:  0 CONTINUE these medications which have CHANGED Dose & Instructions Dispensing Information Comments Morning Noon Evening Bedtime CIALIS 5 mg tablet Generic drug:  tadalafil What changed:  additional instructions Your last dose was: Your next dose is:    
   
   
 Dose:  5 mg Take 1 Tab by mouth nightly. Hold for two weeks. Refills:  0 CONTINUE these medications which have NOT CHANGED Dose & Instructions Dispensing Information Comments Morning Noon Evening Bedtime ALLEGRA 180 mg tablet Generic drug:  fexofenadine Your last dose was: Your next dose is:    
   
   
 Dose:  180 mg Take 180 mg by mouth nightly. Refills:  0  
     
   
   
   
  
 ARTIFICIAL TEARS OP Your last dose was: Your next dose is:    
   
   
 Apply  to eye as needed. Refills:  0  
     
   
   
   
  
 ASPIRIN PO Your last dose was: Your next dose is:    
   
   
 Dose:  81 mg Take 81 mg by mouth. Enteric coated Refills:  0  
     
   
   
   
  
 atorvastatin 40 mg tablet Commonly known as:  LIPITOR Your last dose was: Your next dose is:    
   
   
 Dose:  20 mg Take 20 mg by mouth nightly. Refills:  0  
     
   
   
   
  
 busPIRone 5 mg tablet Commonly known as:  BUSPAR Your last dose was: Your next dose is:    
   
   
 Dose:  5 mg Take 5 mg by mouth two (2) times a day. Refills:  0  
     
   
   
   
  
 diclofenac EC 75 mg EC tablet Commonly known as:  VOLTAREN Your last dose was: Your next dose is:    
   
   
 Dose:  75 mg Take 75 mg by mouth two (2) times a day. Refills:  0  
     
   
   
   
  
 finasteride 5 mg tablet Commonly known as:  PROSCAR Your last dose was: Your next dose is:    
   
   
 Dose:  5 mg Take 5 mg by mouth nightly. Refills:  0  
     
   
   
   
  
 FLONASE 50 mcg/actuation nasal spray Generic drug:  fluticasone Your last dose was: Your next dose is:    
   
   
 Dose:  1 Spray 1 [place] by Both Nostrils route two (2) times daily as needed for Rhinitis. Refills:  0  
     
   
   
   
  
 gabapentin 300 mg capsule Commonly known as:  NEURONTIN Your last dose was: Your next dose is:    
   
   
 Dose:  300 mg Take 300 mg by mouth three (3) times daily. Refills:  0 MELATONIN-PYRIDOXINE (VIT B6) SL Your last dose was: Your next dose is:    
   
   
 by Aerosolization route as needed. At bedtime Refills:  0  
     
   
   
   
  
 niacin 250 mg tablet Your last dose was: Your next dose is:    
   
   
 Dose:  250 mg Take 250 mg by mouth three (3) times daily (with meals). Refills:  0  
     
   
   
   
  
 VITAMIN D3 2,000 unit Tab Generic drug:  cholecalciferol (vitamin D3) Your last dose was: Your next dose is: Take  by mouth daily. Ordered per Dr. Mana Hampton for pre-op vitamin D result. Refills:  0 STOP taking these medications   
 acetaminophen 650 mg CR tablet Commonly known as:  TYLENOL Replaced by:  acetaminophen 500 mg tablet ACETAMINOPHEN EX STR SLEEP AID PO Where to Get Your Medications Information on where to get these meds will be given to you by the nurse or doctor. ! Ask your nurse or doctor about these medications  
  acetaminophen 500 mg tablet  
 oxyCODONE IR 5 mg immediate release tablet  
 polyethylene glycol 17 gram/dose powder Discharge Instructions [address] [facility] Agustin Pope Discharge Instruction Sheet :   Microdiskectomy Ninoska Obi Lema Pain control: 
Typically, we will prescribe a narcotic usually 1-2 tabs every four hours is sufficient for the pain. Most patients need this only for the first few weeks. You should discontinue this as the pain decreases. You should not drive while taking any narcotic pain medications. Constipation Pain medicines and anesthesia can be constipating-this can be prevented by gentle physical activity and drinking plenty of fluid. It should be treated with over-the-counter medications such as Miralax or suppositories, and/or Fleets enema. You should have a bowel movement at least every other day following surgery. Incision care Keep this area clean and dry. Leave the current dressing in place for 7 days. You may shower with this dressing, but DO NOT take a tub bath or go swimming until cleared by your doctor. DO NOT apply lotions, oils, or creams to incision. After 7 days, remove this dressing and apply a new opsite (impermiable)  Dressing over the incision. This dressing may stay on for 7 more days (until your follow up visit with your surgeon). If staples are in place, they should be removed about 14-20 days after surgery. To increase and promote healing: 
? Stop Smoking (or at least cut back on smoking). ? Eat a well-balanced diet (high in protein and vitamin C) ? If your appetite is poor, consider nutritional supplements like Ensure, Glucerna, or [place] Instant Breakfast. 
? If you are diabetic, controlling you blood sugars is very important to prevent infection and promote wound healing. Nutrition: ? If you were on a supplement such as Ensure or Glucerna) while in the hospital, please continue using them with each meal for the next 30 days. ? Eat a well-balanced diet - High in protein, high in vitamins and minerals, especially vitamin C and zinc.  
 
Restrictions: 
Limited bending at waist 
Lift no more than 10 pounds Warning signs : Please call your physician immediately at [phone] if you have ? Bleeding from incision that is constant. ? Change in mental status (unusual behavior or confusion) ? If your incision develops redness or swelling 
? Change in wound drainage (increase in amount, color, or foul odor) ? [place] over 101.5 degrees Fahrenheit  
? Headache that is not relieved with pain medication ? Tenderness or redness in the calf of your leg Emergency: CALL 911 if you have ? Shortness of breath ? Chest pain ? Localized chest pain when coughing or taking a deep breath Follow-up Please call Dr. Mejía [place] office for a follow up appointment in 2 weeks at [phone]. You can return to work when cleared by a physician. During normal business hours you may reach Dr. Dru Bales' team directly at [phone] if you have concerns or questions. Delmer Costa Discharge Orders None BISON Announcement We are excited to announce that we are making your provider's discharge notes available to you in BISON. You will see these notes when they are completed and signed by the physician that discharged you from your recent hospital stay. If you have any questions or concerns about any information you see in BISON, please call the Health Information Department where you were seen or reach out to your Primary Care Provider for more information about your plan of care. Introducing hospitals & HEALTH SERVICES! Valentine Shaw introduces BISON patient portal. Now you can access parts of your medical record, email your doctor's office, and request medication refills online. 1. In your internet browser, go to [email or web address] 2. Click on the First Time User? Click Here link in the Sign In box. You will see the New Member Sign Up page. 3. Enter your Revolights Access Code exactly as it appears below. You will not need to use this code after youve completed the sign-up process. If you do not sign up before the expiration date, you must request a new code. · Revolights Access Code: [email or web address] Expires: 9/26/2017  9:49 AM 
 
4. Enter the last four digits of your Social Security Number (xxxx) and Date of Birth (mm/dd/yyyy) as indicated and click Submit. You will be taken to the next sign-up page. 5. Create a Pathfiret ID. This will be your Revolights login ID and cannot be changed, so think of one that is secure and easy to remember. 6. Create a Revolights password. You can change your password at any time. 7. Enter your Password Reset Question and Answer. This can be used at a later time if you forget your password. 8. Enter your e-mail address. You will receive e-mail notification when new information is available in [address] 9. Click Sign Up. You can now view and download portions of your medical record. 10. Click the Download Summary menu link to download a portable copy of your medical information. If you have questions, please visit the Frequently Asked Questions section of the Revolights website. Remember, Revolights is NOT to be used for urgent needs. For medical emergencies, dial 911. Now available from your iPhone and Android! General Information Please provide this summary of care documentation to your next provider. Patient Signature:  ____________________________________________________________ Date:  ____________________________________________________________  
  
José Miguel Landsman Provider Signature:  ____________________________________________________________ Date:  ____________________________________________________________

## 2017-07-11 NOTE — DISCHARGE SUMMARY
Spine Discharge Summary    Patient ID:  Kashif Vasquez  [id number]  male  74 y.o.  1950    Admit date: 7/11/2017    Discharge date: 7/11/2017    Admitting Physician: Rock Leonard MD     Consulting Physician(s):   Treatment Team: Attending Provider: Rock Leonard MD    Date of Surgery:   7/11/2017     Preoperative Diagnosis:  LUMBAGO, RADICULOPATHY, HNP, STENOSIS    Postoperative Diagnosis:   LUMBAGO, RADICULOPATHY, HNP, STENOSIS    Procedure(s):  RIGHT L4-5 DECOMPRESSION, L5-S1 DISCECTOMY     Anesthesia Type:   General     Surgeon: Rock Leonard MD                            HPI:  Pt is a 74 y.o. male who has a history of LUMBAGO, RADICULOPATHY, HNP, STENOSIS  with pain and limitations of activities of daily living who presents at this time for a L4-L5 Decompression, L5-S1 Discectomy following the failure of conservative management. PMH:   Past Medical History:   Diagnosis Date    Arthritis     entire body    Cancer ([facility] [address]     skin cancers - removed from right arm and right shoulder    Chronic pain     plantar fasciitis (bilateral)    GERD (gastroesophageal reflux disease)     Glaucoma     Ill-defined condition     kidney stones bilateral    Other ill-defined conditions     urticaria    Other ill-defined conditions 12/2012    kidney stones    Other ill-defined conditions     hyperlipidemia    Other ill-defined conditions 2011    glaucoma    Other ill-defined conditions     BPH    Psychiatric disorder     anxiety    Seizures ([facility] [address]     childhood- last at age 24    Sleep apnea        Body mass index is 26.73 kg/(m^2). BMI greater than 30 is classified as obesity. Medications upon admission :   Prior to Admission Medications   Prescriptions Last Dose Informant Patient Reported? Taking? ACETAMINOPHEN/DIPHENHYDRAMINE (ACETAMINOPHEN EX STR SLEEP AID PO) 7/8/2017  Yes No   Sig: Take  by mouth as needed. ASPIRIN PO 6/27/2017  Yes No   Sig: Take 81 mg by mouth.  Enteric coated MELATONIN-PYRIDOXINE, VIT B6, SL 2017  Yes No   Sig: by Aerosolization route as needed. At bedtime   POLYVINYL ALCOHOL/POVIDONE (ARTIFICIAL TEARS OP) Not Taking at Unknown time  Yes No   Sig: Apply  to eye as needed. acetaminophen (TYLENOL) 650 mg CR tablet 7/10/2017 at 1600  Yes Yes   Sig: Take 650 mg by mouth every six (6) hours as needed for Pain. atorvastatin (LIPITOR) 40 mg tablet 2017  Yes No   Sig: Take 20 mg by mouth nightly. busPIRone (BUSPAR) 5 mg tablet Not Taking at Unknown time  Yes No   Sig: Take 5 mg by mouth two (2) times a day. cholecalciferol, vitamin D3, (VITAMIN D3) 2,000 unit tab 7/10/2017 at 1600  Yes Yes   Sig: Take  by mouth daily. Ordered per Dr. Pauly Pike for pre-op vitamin D result. diclofenac EC (VOLTAREN) 75 mg EC tablet 2017  Yes No   Sig: Take 75 mg by mouth two (2) times a day. fexofenadine (ALLEGRA) 180 mg tablet 2017  Yes No   Sig: Take 180 mg by mouth nightly. finasteride (PROSCAR) 5 mg tablet 2017  Yes No   Sig: Take 5 mg by mouth nightly. fluticasone (FLONASE) 50 mcg/actuation nasal spray 2017 at 0930  Yes Yes   [facility] by Both Nostrils route two (2) times daily as needed for Rhinitis.   gabapentin (NEURONTIN) 300 mg capsule 7/10/2017 at 0800  Yes Yes   Sig: Take 300 mg by mouth three (3) times daily. niacin 250 mg tablet 7/10/2017 at 1700  Yes Yes   Sig: Take 250 mg by mouth three (3) times daily (with meals). tadalafil (CIALIS) 5 mg tablet   Yes Yes   Sig: Take 1 Tab by mouth nightly. Hold for two weeks. Facility-Administered Medications: None        Allergies:  No Known Allergies     Hospital Course: The patient underwent surgery. Complications:  None; patient tolerated the procedure well. Was taken to the PACU in stable condition and then transferred to the ortho floor.       Perioperative Antibiotics:  Ancef     Postoperative Pain Management:  Oxycodone     Postoperative transfusions:    Number of units banked PRBCs = none     Post Op complications: none    Hemoglobin at discharge:    Lab Results   Component Value Date/Time    HGB 11.4 (L) 02/27/2014 04:00 AM    INR 1.1 06/28/2017 10:11 AM       Dressing  - clean, dry and intact. No significant erythema or swelling. Neurovascular exam found to be within normal limits. Wound appears to be healing without any evidence of infection. Physical Therapy started on the day following surgery and participated in bed mobility, transfers and ambulation. Gait:                      Discharged to: Home. Condition on Discharge:   stable    Discharge instructions:    - Take pain medications as prescribed  - Resume pre hospital diet      - Discharge activity: activity as tolerated  - Ambulate as tolerated  - Wound Care Keep wound clean and dry. See discharge instruction sheet.  - Staples, if present, to be removed 10-14 days after surgery            -DISCHARGE MEDICATION LIST     Current Discharge Medication List      START taking these medications    Details   acetaminophen (TYLENOL) 500 mg tablet Take 2 Tabs by mouth every six (6) hours for 14 days. Qty: 112 Tab, Refills: 0      oxyCODONE IR (ROXICODONE) 5 mg immediate release tablet Take 1-2 Tabs by mouth every three (3) hours as needed. Max Daily Amount: 80 mg.  Qty: 80 Tab, Refills: 0      polyethylene glycol (MIRALAX) 17 gram/dose powder Take 17 g by mouth daily for 15 days. Qty: 255 g, Refills: 0         CONTINUE these medications which have CHANGED    Details   tadalafil (CIALIS) 5 mg tablet Take 1 Tab by mouth nightly. Hold for two weeks. CONTINUE these medications which have NOT CHANGED    Details   cholecalciferol, vitamin D3, (VITAMIN D3) 2,000 unit tab Take  by mouth daily. Ordered per Dr. Wes Chisholm for pre-op vitamin D result.      gabapentin (NEURONTIN) 300 mg capsule Take 300 mg by mouth three (3) times daily. niacin 250 mg tablet Take 250 mg by mouth three (3) times daily (with meals).       fluticasone (FLONASE) 50 mcg/actuation nasal spray 1 [place] by Both Nostrils route two (2) times daily as needed for Rhinitis. atorvastatin (LIPITOR) 40 mg tablet Take 20 mg by mouth nightly. POLYVINYL ALCOHOL/POVIDONE (ARTIFICIAL TEARS OP) Apply  to eye as needed. diclofenac EC (VOLTAREN) 75 mg EC tablet Take 75 mg by mouth two (2) times a day. busPIRone (BUSPAR) 5 mg tablet Take 5 mg by mouth two (2) times a day. ASPIRIN PO Take 81 mg by mouth. Enteric coated      MELATONIN-PYRIDOXINE, VIT B6, SL by Aerosolization route as needed. At bedtime      finasteride (PROSCAR) 5 mg tablet Take 5 mg by mouth nightly. fexofenadine (ALLEGRA) 180 mg tablet Take 180 mg by mouth nightly.          STOP taking these medications       acetaminophen (TYLENOL) 650 mg CR tablet Comments:   Reason for Stopping:         ACETAMINOPHEN/DIPHENHYDRAMINE (ACETAMINOPHEN EX STR SLEEP AID PO) Comments:   Reason for Stopping:            per medical continuation form      -Follow up in office in 2 weeks      Signed:  Jemal Olvera  MSN, APRN, NP-C, [facility]  Orthopaedic Nurse Practitioner    7/11/2017  4:54 PM

## 2017-07-11 NOTE — ANESTHESIA PREPROCEDURE EVALUATION
Anesthetic History   No history of anesthetic complications            Review of Systems / Medical History  Patient summary reviewed, nursing notes reviewed and pertinent labs reviewed    Pulmonary  Within defined limits                 Neuro/Psych     seizures: well controlled    Psychiatric history    Comments: Seizures as a child Cardiovascular  Within defined limits                     GI/Hepatic/Renal     GERD    Renal disease: stones       Endo/Other        Arthritis     Other Findings              Physical Exam    Airway  Mallampati: II  TM Distance: > 6 cm  Neck ROM: normal range of motion   Mouth opening: Normal     Cardiovascular  Regular rate and rhythm,  S1 and S2 normal,  no murmur, click, rub, or gallop             Dental  No notable dental hx       Pulmonary  Breath sounds clear to auscultation               Abdominal  GI exam deferred       Other Findings            Anesthetic Plan    ASA: 2  Anesthesia type: general          Induction: Intravenous  Anesthetic plan and risks discussed with: Patient           Anesthetic History   No history of anesthetic complications           Review of Systems / Medical History  Patient summary reviewed, nursing notes reviewed and pertinent labs reviewed    Pulmonary  Within defined limits               Neuro/Psych     seizures (as a child)         Cardiovascular  Within defined limits              Exercise tolerance: >4 METS     GI/Hepatic/Renal     GERD: poorly controlled      Hiatal hernia       Endo/Other        Arthritis     Other Findings            Physical Exam    Airway  Mallampati: II  TM Distance: 4 - 6 cm  Neck ROM: normal range of motion   Mouth opening: Normal     Cardiovascular    Rhythm: regular  Rate: normal         Dental  No notable dental hx       Pulmonary  Breath sounds clear to auscultation               Abdominal  GI exam deferred       Other Findings            Anesthetic Plan    ASA: 2  Anesthesia type: general and regional - femoral continuous and sciatic single shot      Post-op pain plan if not by surgeon: peripheral nerve block continuous      Anesthetic plan and risks discussed with: Patient

## 2017-07-11 NOTE — DISCHARGE INSTRUCTIONS
[address]    Discharge Instruction Sheet :   Microdiskectomy    DR. Godwin Brown    Pain control:  Typically, we will prescribe a narcotic usually 1-2 tabs every four hours is sufficient for the pain. Most patients need this only for the first few weeks. You should discontinue this as the pain decreases. You should not drive while taking any narcotic pain medications. Constipation  Pain medicines and anesthesia can be constipating-this can be prevented by gentle physical activity and drinking plenty of fluid. It should be treated with over-the-counter medications such as Miralax or suppositories, and/or Fleets enema. You should have a bowel movement at least every other day following surgery. Incision care    Keep this area clean and dry. Leave the current dressing in place for 7 days. You may shower with this dressing, but DO NOT take a tub bath or go swimming until cleared by your doctor. DO NOT apply lotions, oils, or creams to incision. After 7 days, remove this dressing and apply a new opsite (impermiable)  Dressing over the incision. This dressing may stay on for 7 more days (until your follow up visit with your surgeon). If staples are in place, they should be removed about 14-20 days after surgery. To increase and promote healing:   Stop Smoking (or at least cut back on smoking).  Eat a well-balanced diet (high in protein and vitamin C)   If your appetite is poor, consider nutritional supplements like Ensure, Glucerna, or [place] Instant Breakfast.   If you are diabetic, controlling you blood sugars is very important to prevent infection and promote wound healing. Nutrition:   If you were on a supplement such as Ensure or Glucerna) while in the hospital, please continue using them with each meal for the next 30 days.    Eat a well-balanced diet - High in protein, high in vitamins and minerals, especially vitamin C and zinc.     Restrictions:  Limited bending at waist  Lift no more than 10 pounds    Warning signs : Please call your physician immediately at [phone] if you have   Bleeding from incision that is constant.  Change in mental status (unusual behavior or confusion)   If your incision develops redness or swelling   Change in wound drainage (increase in amount, color, or foul odor)   [place] over 101.5 degrees Fahrenheit    Headache that is not relieved with pain medication   Tenderness or redness in the calf of your leg    Emergency: CALL 911 if you have   Shortness of breath   Chest pain   Localized chest pain when coughing or taking a deep breath    Follow-up  Please call Dr. Augie Hardy office for a follow up appointment in 2 weeks at [phone]. You can return to work when cleared by a physician. During normal business hours you may reach Dr. Aryan Casarez' team directly at [phone] if you have concerns or questions.       Paramjit Verduzco

## 2017-07-11 NOTE — PROGRESS NOTES
Ortho/ NeuroSurgery NP Note    POD# 0  s/p RIGHT L4-5 DECOMPRESSION, L5-S1 DISCECTOMY     Pt resting in bed. No complaints. Denies pain currently. Pain the back and numbness down the right leg pre-op. A&O x4   VSS Afebrile. Patient has had something to eat. No nausea. Most Recent Labs:   Lab Results   Component Value Date/Time    HGB 11.4 02/27/2014 04:00 AM    Hemoglobin A1c 6.0 06/28/2017 10:11 AM     MRSA Screen Pre-op Negative  U/A Screen Pre-Op Negative    Body mass index is 26.73 kg/(m^2). BMI greater than 30 is classified as obesity and greater than 40 is classified as morbid obesity. STOP BANG Score: 5 Patient will remain on continuous sat monitoring per protocol. Social History: No significant history. So out. Patient needs to void today. Dressing c.d.i    Calves soft and supple; No pain with passive stretch  Sensation and motor intact  SCDs for mechanical DVT proph    Plan:  1) Mobilize with nursing. 2) Lilliam-op Antibiotics Ancef  3) Discharge plans to home with his \"sweetie\".      Rosemary Goodwin NP

## 2017-07-11 NOTE — PERIOP NOTES
1435-Handoff Report from Operating Room to PACU    Report received from [address] and Gladys Hicks CRNA regarding Kashif Vasquez. Surgeon(s):  Rock Leonard MD  And Procedure(s) (LRB):  RIGHT L4-5 DECOMPRESSION, L5-S1 DISCECTOMY (N/A)  confirmed   with allergies and dressings discussed. Anesthesia type, drugs, patient history, complications, estimated blood loss, vital signs, intake and output, and last pain medication, lines, reversal medications and temperature were reviewed. 1530- Family updated. 1545-TRANSFER - OUT REPORT:    Verbal report given to Ihsan Christainson RN(name) on Kashif Vasquez  being transferred to ortho(unit) for routine post - op       Report consisted of patients Situation, Background, Assessment and   Recommendations(SBAR). Information from the following report(s) SBAR, Kardex, OR Summary, Procedure Summary, Intake/Output, MAR and Recent Results was reviewed with the receiving nurse. Opportunity for questions and clarification was provided.       Patient transported with:   O2 @ 2 liters  Tech

## 2017-07-11 NOTE — ANESTHESIA POSTPROCEDURE EVALUATION
Post-Anesthesia Evaluation and Assessment    Patient: Paramjit Verduzco MRN: [id number]  SSN: [id number]    YOB: 1950  Age: 74 y.o. Sex: male       Cardiovascular Function/Vital Signs  Visit Vitals    /66    Pulse 94    Temp 36.9 °C (98.4 °F)    Resp 16    Ht 6' (1.829 m)    Wt 89.4 kg (197 lb 1.5 oz)    SpO2 98%    BMI 26.73 kg/m2       Patient is status post general anesthesia for Procedure(s):  RIGHT L4-5 DECOMPRESSION, L5-S1 DISCECTOMY. Nausea/Vomiting: None    Postoperative hydration reviewed and adequate. Pain:  Pain Scale 1: Numeric (0 - 10) (07/11/17 1500)  Pain Intensity 1: 5 (07/11/17 1500)   Managed    Neurological Status:   Neuro (WDL): Exceptions to WDL (07/11/17 1052)  Neuro  LLE Motor Response: Spontaneous ; Purposeful (07/11/17 1052)  RLE Motor Response: Spontaneous ; Purposeful;Numbness;Tingling (07/11/17 1052)   At baseline    Mental Status and Level of Consciousness: Arousable    Pulmonary Status:   O2 Device: Nasal cannula (07/11/17 1500)   Adequate oxygenation and airway patent    Complications related to anesthesia: None    Post-anesthesia assessment completed.  No concerns    Signed By: Hernesto Underwood MD     July 11, 2017

## 2017-07-11 NOTE — PROGRESS NOTES
Patient discharge home in stable condition via wheelchair, accompanied by his friend and hospital transport. Discharged instruction & prescription given.

## 2017-07-11 NOTE — H&P
Subjective:     Patient ID: Lori Rodríguez is a 74 y.o. male. Chief Complaint: No chief complaint on file.        HPI: Lori oRdríguez is a 74 y.o. male with complaints of low back pain radiating into the right lower extremity. He threw his back out doing yard work about a year ago. He had low back pain after that and he assumed it was muscular in nature. Soon after he started to notice pain radiating down the right leg posteriorly with numbness in the lateral toes. He says symptoms have worsened significantly in the last year. He has tried NSAIDs and epidural steroid injections with Dr. Gill Hilton and he was also treated by Dr. Reid Adorno who referred him to me. The pain is sharp, aching and throbbing in quality. It has been present for one year and is there constnatly. It is rated 8 out of 10 on the VAS. It is worse with bending, sitting, walking and lifting  and better with lying down flat in bed. This pain has really been affecting his quality of life and he feels like he has to put his life on hold due to the pain. He has no pain radiating down the left lower extremity. There are no active problems to display for this patient.       Family History   Problem Relation Age of Onset    Diabetes Mother     Depression Mother     Heart Attack Mother     Gout Mother     Obesity Mother     Hypertension Mother     Heart Attack Father     Alcohol abuse Father     No Known Problems Sister     Cancer Brother     No Known Problems Brother         Social History   Substance Use Topics    Smoking status: Never Smoker    Smokeless tobacco: Never Used    Alcohol use Yes      Comment: 1 drink per week - rarely       Past Medical History:   Diagnosis Date    Arthritis     entire body    Cancer ([facility] [address]     skin cancers - removed from right arm and right shoulder    Chronic pain     plantar fasciitis (bilateral)    GERD (gastroesophageal reflux disease)     Glaucoma     Ill-defined condition     kidney stones bilateral    Other ill-defined conditions     urticaria    Other ill-defined conditions 12/2012    kidney stones    Other ill-defined conditions     hyperlipidemia    Other ill-defined conditions 2011    glaucoma    Other ill-defined conditions     BPH    Psychiatric disorder     anxiety    Seizures ([facility] [address]     childhood- last at age 24    Sleep apnea         Past Surgical History:   Procedure Laterality Date    HX HEENT      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    HX HERNIA REPAIR  6206    umbilical    HX HERNIA REPAIR  1957    inguinal -left    HX KNEE ARTHROSCOPY Right     x3    HX KNEE ARTHROSCOPY Left     x3    HX KNEE REPLACEMENT  2014    right    HX ORTHOPAEDIC      left foot tendon to help with plantar fasciitis    HX ROTATOR CUFF REPAIR  12/2016    left    HX TONSILLECTOMY  1964    HX TRABECULECTOMY      HX VASECTOMY  1983          Current Facility-Administered Medications:     ceFAZolin (ANCEF) 2 g in 0.9% sodium chloride 50 mL IVPB, 2 g, IntraVENous, ONCE, Luis Lau MD    lactated Ringers infusion, 25 mL/hr, IntraVENous, CONTINUOUS, José Ornelas MD, Last Rate: 25 mL/hr at 07/11/17 1126, 25 mL/hr at 07/11/17 1126    lactated Ringers infusion, 25 mL/hr, IntraVENous, CONTINUOUS, Mynor Wallis MD    0.9% sodium chloride infusion, 25 mL/hr, IntraVENous, CONTINUOUS, Mynor Wallis MD    sodium chloride (NS) flush 5-10 mL, 5-10 mL, IntraVENous, Q8H, Mynor Wallis MD    sodium chloride (NS) flush 5-10 mL, 5-10 mL, IntraVENous, PRN, Mynor Wallis MD    lidocaine (PF) (XYLOCAINE) 10 mg/mL (1 %) injection 0.1 mL, 0.1 mL, SubCUTAneous, PRN, Mynor Wallis MD    fentaNYL citrate (PF) injection 50 mcg, 50 mcg, IntraVENous, PRN, Mynor Wallis MD    midazolam (VERSED) injection 1 mg, 1 mg, IntraVENous, PRN, Mynor Wallis MD    midazolam (VERSED) injection 1 mg, 1 mg, IntraVENous, PRN, Mynor Wallis MD    acetaminophen (TYLENOL) tablet 650 mg, 650 mg, Oral, ONCE, MD Cassandra Stark Dr. Lush solution (COMPOUND) w/ morphine (PYXIS), , Injection, ONCE, Akira Briseno MD    No Known Allergies     ROS:   No new bowel or bladder incontinence. No fevers or chills. No saddle anesthesia. Objective:     Visit Vitals    /78 (BP 1 Location: Right arm, BP Patient Position: At rest)    Pulse 75    Temp 98.6 °F (37 °C)    Resp 18    Ht 6' (1.829 m)    Wt 89.4 kg (197 lb 1.5 oz)    SpO2 98%    BMI 26.73 kg/m2       Body mass index is 26.73 kg/(m^2). , a BMI over 30 is considered obese and a BMI over 40 has been associated with a higher risk of surgical complications. Constitutional: No acute distress. Well nourished. HEENT: Normocephalic. Respiratory:  No labored breathing. Cardiovascular:  No marked cyanosis. Skin:  No marked skin ulcers/lesions on bilateral upper or lower extremities. Psychiatric: Alert and oriented x3. Inspection: No gross deformity of bilateral upper or lower extremities. Musculoskeletal/Neurological:   Gait/balance:  - Normal. Able to walk on heels and toes. Thoracolumbar spine:  - No tenderness to palpation  - Full range of motion.   Right lower extremity:  - No tenderness to palpation   - Full range of motion  - No pain with internal/external rotation of the hip  - Strength:  - 5 out of 5 to hip flexors  - 5 out of 5 to knee extensors  - 5 out of 5 to ankle dorsiflexors  - 5 out of 5 to great toe extensors  - 5 out of 5 to ankle plantar flexors  - Negative straight leg raise  Left lower extremity:  - No tenderness to palpation   - Full range of motion  - No pain with internal/external rotation of the hip  - Strength:  - 5 out of 5 to hip flexors  - 5 out of 5 to knee extensors  - 5 out of 5 to ankle dorsiflexors  - 5 out of 5 to great toe extensors  - 5 out of 5 to ankle plantar flexors  - Negative straight leg raise  Sensation:  - Intact to light touch  Reflexes:  - +2 Patellar tendon   - +2 Achilles tendon   Downgoing Babinski's      Radiographs:       No results found. Assessment:     No diagnosis found. 1. Lumbosacral spondylosis without myelopathy    2. Chronic low back pain with sciatica, sciatica laterality unspecified, unspecified back pain laterality    3. Sciatica, unspecified laterality    4. Spinal stenosis, lumbar region, with neurogenic claudication    5. Displacement of lumbar intervertebral disc without myelopathy            Plan:     I have discussed the procedure in detail with Ruben Collins and mentioned complications, including but not limited to: death, permanent disability, heart attack, stroke, lung injury or infection, blindness, ileus, bladder or bowel problems, ureter injury, bleeding, nerve injury (including numbness, pain and weakness), paralysis (which may be permanent), failure to heal, failure to fuse bone together in fusion procedures, failure to relief symptoms, failure to relief pain, increased pain, need for further surgeries, failure or breakage or hardware, malpositioning of hardware, need to fuse or operate on additional levels determined either during or after surgery, destabilization of the spine (which may require fusion or later surgery), infections (which may or may not require additional surgery), dural tears (tears of the sac holding in nerves and spinal fluid), meningitis, voice changes, vocal cord injury, hoarseness, blood clots, pulmonary embolus, Madeleine syndrome, recurrent disc herniation, diaphragm paralysis, and anesthetic complications. Comorbidities such as obesity, smoking, rheumatoid arthritis, chronic steroid use and diabetes increase these risks. Ruben Collins understands and wants to proceed.      L4-L5 decompression and right L5-S1 microdiskectomy            Inés Cerrato MD

## 2017-07-11 NOTE — BRIEF OP NOTE
BRIEF OPERATIVE NOTE    Date of Procedure: 7/11/2017   Preoperative Diagnosis: LUMBAGO, RADICULOPATHY, HNP, STENOSIS  Postoperative Diagnosis: LUMBAGO, RADICULOPATHY, HNP, STENOSIS    Procedure(s):  RIGHT L4-5 DECOMPRESSION, L5-S1 DISCECTOMY  Surgeon(s) and Role:     * Shahriar Hansen MD - Primary         Assistant Staff:  Physician Assistant: Gavin Rosario    Surgical Staff:  Circ-1: Feng Bhardwaj RN  Circ-Relief: Matilda Seay RN  Physician Assistant: Gavin Rosario  Radiology Technician: Sarah Olivas  Scrub Tech-1: Silvia Norman  Scrub Tech-2: Carmen Campbell  Scrub RN-1: Matilda Seay RN  Event Time In   Incision Start 1326   Incision Close      Anesthesia: General   Estimated Blood Loss: 125cc  Specimens: * No specimens in log *   Findings: stenosis/hnp   Complications: none  Implants: * No implants in log *

## 2017-07-11 NOTE — IP AVS SNAPSHOT
Current Discharge Medication List  
  
START taking these medications Dose & Instructions Dispensing Information Comments Morning Noon Evening Bedtime  
 acetaminophen 500 mg tablet Commonly known as:  TYLENOL Replaces:  acetaminophen 650 mg CR tablet Your last dose was: Your next dose is:    
   
   
 Dose:  1000 mg Take 2 Tabs by mouth every six (6) hours for 14 days. Quantity:  112 Tab Refills:  0  
     
   
   
   
  
 oxyCODONE IR 5 mg immediate release tablet Commonly known as:  Jessy Beal Your last dose was: Your next dose is:    
   
   
 Dose:  5-10 mg Take 1-2 Tabs by mouth every three (3) hours as needed. Max Daily Amount: 80 mg.  
 Quantity:  80 Tab Refills:  0  
     
   
   
   
  
 polyethylene glycol 17 gram/dose powder Commonly known as:  Jigar Paula Your last dose was: Your next dose is:    
   
   
 Dose:  17 g Take 17 g by mouth daily for 15 days. Quantity:  255 g Refills:  0 CONTINUE these medications which have CHANGED Dose & Instructions Dispensing Information Comments Morning Noon Evening Bedtime CIALIS 5 mg tablet Generic drug:  tadalafil What changed:  additional instructions Your last dose was: Your next dose is:    
   
   
 Dose:  5 mg Take 1 Tab by mouth nightly. Hold for two weeks. Refills:  0 CONTINUE these medications which have NOT CHANGED Dose & Instructions Dispensing Information Comments Morning Noon Evening Bedtime ALLEGRA 180 mg tablet Generic drug:  fexofenadine Your last dose was: Your next dose is:    
   
   
 Dose:  180 mg Take 180 mg by mouth nightly. Refills:  0  
     
   
   
   
  
 ARTIFICIAL TEARS OP Your last dose was: Your next dose is:    
   
   
 Apply  to eye as needed.   
 Refills:  0  
     
   
   
   
  
 ASPIRIN PO  
   
 Your last dose was: Your next dose is:    
   
   
 Dose:  81 mg Take 81 mg by mouth. Enteric coated Refills:  0  
     
   
   
   
  
 atorvastatin 40 mg tablet Commonly known as:  LIPITOR Your last dose was: Your next dose is:    
   
   
 Dose:  20 mg Take 20 mg by mouth nightly. Refills:  0  
     
   
   
   
  
 busPIRone 5 mg tablet Commonly known as:  BUSPAR Your last dose was: Your next dose is:    
   
   
 Dose:  5 mg Take 5 mg by mouth two (2) times a day. Refills:  0  
     
   
   
   
  
 diclofenac EC 75 mg EC tablet Commonly known as:  VOLTAREN Your last dose was: Your next dose is:    
   
   
 Dose:  75 mg Take 75 mg by mouth two (2) times a day. Refills:  0  
     
   
   
   
  
 finasteride 5 mg tablet Commonly known as:  PROSCAR Your last dose was: Your next dose is:    
   
   
 Dose:  5 mg Take 5 mg by mouth nightly. Refills:  0  
     
   
   
   
  
 FLONASE 50 mcg/actuation nasal spray Generic drug:  fluticasone Your last dose was: Your next dose is:    
   
   
 Dose:  1 Spray 1 [place] by Both Nostrils route two (2) times daily as needed for Rhinitis. Refills:  0  
     
   
   
   
  
 gabapentin 300 mg capsule Commonly known as:  NEURONTIN Your last dose was: Your next dose is:    
   
   
 Dose:  300 mg Take 300 mg by mouth three (3) times daily. Refills:  0 MELATONIN-PYRIDOXINE (VIT B6) SL Your last dose was: Your next dose is:    
   
   
 by Aerosolization route as needed. At bedtime Refills:  0  
     
   
   
   
  
 niacin 250 mg tablet Your last dose was: Your next dose is:    
   
   
 Dose:  250 mg Take 250 mg by mouth three (3) times daily (with meals). Refills:  0  
     
   
   
   
  
 VITAMIN D3 2,000 unit Tab Generic drug:  cholecalciferol (vitamin D3) Your last dose was: Your next dose is: Take  by mouth daily. Ordered per Dr. Amador Judd for pre-op vitamin D result. Refills:  0 STOP taking these medications   
 acetaminophen 650 mg CR tablet Commonly known as:  TYLENOL Replaced by:  acetaminophen 500 mg tablet ACETAMINOPHEN EX STR SLEEP AID PO Where to Get Your Medications Information on where to get these meds will be given to you by the nurse or doctor. ! Ask your nurse or doctor about these medications  
  acetaminophen 500 mg tablet  
 oxyCODONE IR 5 mg immediate release tablet  
 polyethylene glycol 17 gram/dose powder

## 2017-07-12 NOTE — OP NOTES
[address], [facility]   OP NOTE       Name:  Ashvin Gr   MR#:  [id number]   :  1950   Account #:  [de-identified]    Surgery Date:  2017   Date of Adm:  2017       PREOPERATIVE DIAGNOSES   1. L4-5 spinal stenosis with claudication. 2. L5-S1 disk herniation. 3. Sciatica. 4. Lumbago. POSTOPERATIVE DIAGNOSES   1. L4-5 spinal stenosis with claudication. 2. L5-S1 disk herniation. 3. Sciatica. 4. Lumbago. PROCEDURES PERFORMED:     1. L4-5 laminectomy, fasciectomy, foraminotomy for purpose of neural   decompression of the L4 and L5 nerve roots. 2. L5-S1 microdiskectomy. 3. Use of operating microscope. ESTIMATED BLOOD LOSS: 125 mL     SPECIMENS REMOVED: None. ANESTHESIA:  General.    SURGEON: Sajan Perez MD    FIRST ASSISTANT: Dulce Fletcher. JACKIE Brown    COMPLICATIONS: None. DRAINS: None. IMPLANTS: None. INDICATIONS FOR PROCEDURE:  The patient is a pleasant 80-year-  old gentleman with an L5-S1 disk herniation and L4-5 spinal stenosis   resulting in lumbago and sciatica and has failed to improve with   nonoperative treatment. At this point he would like to proceed with   surgical intervention. I have given him warnings about the possible   complications including, but not limited, pain, scar, bleeding, infection,   nonunion, damage to surrounding structures, death, paralysis,   blindness and stroke. He understands and wants to proceed. NARRATIVE OF THE PROCEDURE: After the informed consent was   obtained and the operative site was properly marked, the patient   moved back to the operating room and underwent general   endotracheal anesthesia. He was positioned prone on the operating   room table using the [employer] frame. His arms were placed in   a 90/90 position. Knees were gently bent with pillows. Fluoroscopy was   used to luda the level of the incision.  We then proceeded to prep and   drape in the usual manner. A timeout was obtained verifying the   correct patient, the correct surgery, the correct site, as well as that he   had received IV antibiotics within 30 minutes of the incision. I then proceeded to perform my standard posterior approach of the   lumbar spine exposing the area between L4 and S1 on the right side. Once it was exposed and hemostasis obtained, fluoroscopy was used   to verify that we were in the correct level. I then placed a Melly   retractor in position and then brought in the operating microscope. Using the [address], I performed U cut laminectomy at L4 and a J cut   at L5. The intervening bone was removed with a pituitary. The   ligamentum flavum was detached from superior leading edge with a   curved curette. We flipped bone defect and removed with a Kerrison #3   followed by Kerrison #4. In the process I removed the medial branch of   facet and was able to free the transition nerve root in the lateral recess. I then followed it distally and using the [address] performed a keyhole   decompression by drilling inferomedial to the pedicle screw and following the nerve root into the foramina. Once that was completed, the area was inspected with a Mckeon ball   and noticed to be fully decompressed. I then moved down to the L5-S1   level, performed a U cut laminotomy at L5 and then was able to retract   the thecal sac medially. I was able to find the disk herniation. There   was a contained disk herniation. We used the 15 blade to perform a   Linear annulotomy and then removed the herniated disk material with the   micropituitary. The disk was then irrigated with normal saline using a   Lee tip and a 60 mL syringe until all free fragments were removed. Once that area was fully decompressed and hemostasis was obtained,   the wound was irrigated with normal saline. I was able then to   inspect the neural structures and verify they were decompressed. I   then closed the fascia #1 Vicryl figure-of-eight interrupted sutures,   followed by irrigating the subcu, closed subcu with 2-0 Vicryl and the   skin with 3-0 running Monocryl and Dermabond. Sterile dressing was   applied. The patient was then awakened and transferred to PACU in   stable condition. POSTOPERATIVE PLAN: The patient is going to remain here   overnight. He will be given SCDs and LEO hose for DVT prophylaxis   and Ancef for infection prophylaxis.         MD LUBNA Clark / FILI   D:  07/11/2017   14:16   T:  07/12/2017   08:23   Job #:  [id number]

## 2017-12-01 ENCOUNTER — HOSPITAL ENCOUNTER (OUTPATIENT)
Dept: MRI IMAGING | Age: 67
Discharge: HOME | End: 2017-12-01
Attending: OTOLARYNGOLOGY
Payer: COMMERCIAL

## 2017-12-01 DIAGNOSIS — H90.3 SENSORINEURAL HEARING LOSS, BILATERAL: ICD-10-CM

## 2017-12-01 DIAGNOSIS — R42 DIZZINESS: ICD-10-CM

## 2017-12-01 PROCEDURE — 70551 MRI BRAIN STEM W/O DYE: CPT

## 2018-03-02 ENCOUNTER — HOSPITAL ENCOUNTER (OUTPATIENT)
Dept: ULTRASOUND IMAGING | Age: 68
Discharge: HOME | End: 2018-03-02
Payer: MEDICARE

## 2018-03-02 DIAGNOSIS — M79.604 RIGHT LEG PAIN: ICD-10-CM

## 2018-03-02 PROCEDURE — 93971 EXTREMITY STUDY: CPT

## 2019-07-11 ENCOUNTER — OFFICE VISIT (OUTPATIENT)
Dept: NEUROLOGY | Age: 69
End: 2019-07-11

## 2019-07-11 DIAGNOSIS — R41.3 SHORT-TERM MEMORY LOSS: ICD-10-CM

## 2019-07-11 DIAGNOSIS — R41.89 COGNITIVE DECLINE: ICD-10-CM

## 2019-07-11 DIAGNOSIS — G31.84 MILD COGNITIVE IMPAIRMENT: Primary | ICD-10-CM

## 2019-07-11 DIAGNOSIS — F43.22 ADJUSTMENT DISORDER WITH ANXIETY: ICD-10-CM

## 2019-07-11 DIAGNOSIS — Z87.898 HISTORY OF SEIZURES: ICD-10-CM

## 2019-07-11 NOTE — PROGRESS NOTES
[address] [facility],[address]  [address]   [address] [place] [address] [place] [address] [address]   [phone] Office   [phone] Fax      Neuropsychology    Initial Diagnostic Interview Note      Referral:  PCP    Brennan Murguia is a 81 y.o. right handed partnered  who was unaccompanied to the initial clinical interview on 7/11/19 . Please refer to his medical records for details pertaining to his history. Briefly, the patient reported that he is currently in school working on another degree in interdisciplinary studies (Physics and Math) and completed an engineering degree. He was a poor student in high school because he would rather do other things. He works at [address] [place] [address] as a . Retired in 2002. 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 He has noticed a decline in cognition. He has short term memory problems. Forgets the content of conversations. He has noticed that his reasoning skills have declined. 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 For the past five years, he is just not quite as sharp. Today, he was listening to a lecture in mathematics, linear algebra, and was listening to the professor and watching him write on the screen and he couldn't comprehend what was being said. He is independent for medications, finances, driving, day-to-day chores, ADLs. Last known seizure was at age 24. He has not been on medication for epilepsy since that time. He has a history of head injuries as well. He was working in [employer] in college and does not recall exactly what happened but he was found lying in a pool of blood in the aisle. Spent 3-5 days in the hospital due to concussions. He studied martial arts for 50 years and has been hit in the head multiple times without LOC. Sleep is poor. HE has been diagnosed with mild sleep apnea.  Will sleep 45 minutes to an hour, vivid dream, and wake up, and it happens several times a night. Does not get restorative sleep. No CPAP or BiPAP. Appetite is too good. There has been a change in sense of taste. Stopped enjoying chocolate  - taste of same. Anxious about what is going on, and gets anxious about day-to-day things, and compensates by working harder. He worries about the state of the world. He has never had psychotherapy for anxiety nor has he been on medication for anxiety. He did see doctor for claustrophobia and was given prn medication for same, but made him groggy so stopped taking it. There is a family history of dementia. His brother was never \"quite right. \"  His mother became very strange toward the end of her life, and she  at 68. Father  at age 48. No previous neuropsych. Neuropsychological Mental Status Exam (NMSE):  Historian: Good  Praxis: No UE apraxia  R/L Orientation: Intact to self and to other  Dress: within normal limits   Weight: within normal limits   Appearance/Hygiene: within normal limits   Gait: within normal limits   Assistive Devices: None  Mood: within normal limits   Affect: within normal limits   Comprehension: within normal limits   Thought Process: within normal limits   Expressive Language: within normal limits   Receptive Language: within normal limits   Motor:  No cognitive or motor perseveration  ETOH: Occasionally   Tobacco: Denied  Illicit: Denied, not since 76s.     SI/HI: Denied  Psychosis: Denied  Insight: Within normal limits  Judgment: Within normal limits  Other Psych:      Past Medical History:   Diagnosis Date    Arthritis     entire body    Cancer ([facility] [address]     skin cancers - removed from right arm and right shoulder    Chronic pain     plantar fasciitis (bilateral)    GERD (gastroesophageal reflux disease)     Glaucoma     Ill-defined condition     kidney stones bilateral    Other ill-defined conditions(759.89)     urticaria    Other ill-defined conditions(799.89) 12/2012    kidney stones    Other ill-defined conditions(799.89)     hyperlipidemia    Other ill-defined conditions(799.89) 2011    glaucoma    Other ill-defined conditions(799.89)     BPH    Psychiatric disorder     anxiety    Seizures ([facility] [address]     childhood- last at age 24    Sleep apnea        Past Surgical History:   Procedure Laterality Date    HX HEENT      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    HX HERNIA REPAIR  4637    umbilical    HX HERNIA REPAIR  1957    inguinal -left    HX KNEE ARTHROSCOPY Right     x3    HX KNEE ARTHROSCOPY Left     x3    HX KNEE REPLACEMENT  2014    right    HX ORTHOPAEDIC      left foot tendon to help with plantar fasciitis    HX ROTATOR CUFF REPAIR  12/2016    left    HX TONSILLECTOMY  1964    HX TRABECULECTOMY      HX VASECTOMY  1983       No Known Allergies    Family History   Problem Relation Age of Onset    Diabetes Mother     Depression Mother     Heart Attack Mother     Gout Mother     Obesity Mother     Hypertension Mother     Heart Attack Father     Alcohol abuse Father     No Known Problems Sister     Cancer Brother     No Known Problems Brother        Social History     Tobacco Use    Smoking status: Never Smoker    Smokeless tobacco: Never Used   Substance Use Topics    Alcohol use: Yes     Comment: 1 drink per week - rarely    Drug use: No       Current Outpatient Medications   Medication Sig Dispense Refill    tadalafil (CIALIS) 5 mg tablet Take 1 Tab by mouth nightly. Hold for two weeks.  oxyCODONE IR (ROXICODONE) 5 mg immediate release tablet Take 1-2 Tabs by mouth every three (3) hours as needed. Max Daily Amount: 80 mg. 80 Tab 0    cholecalciferol, vitamin D3, (VITAMIN D3) 2,000 unit tab Take  by mouth daily. Ordered per Dr. Marjorie Lamar for pre-op vitamin D result.  gabapentin (NEURONTIN) 300 mg capsule Take 300 mg by mouth three (3) times daily.       atorvastatin (LIPITOR) 40 mg tablet Take 20 mg by mouth nightly.  POLYVINYL ALCOHOL/POVIDONE (ARTIFICIAL TEARS OP) Apply  to eye as needed.  diclofenac EC (VOLTAREN) 75 mg EC tablet Take 75 mg by mouth two (2) times a day.  busPIRone (BUSPAR) 5 mg tablet Take 5 mg by mouth two (2) times a day.  ASPIRIN PO Take 81 mg by mouth. Enteric coated      MELATONIN-PYRIDOXINE, VIT B6, SL by Aerosolization route as needed. At bedtime      niacin 250 mg tablet Take 250 mg by mouth three (3) times daily (with meals).  fluticasone (FLONASE) 50 mcg/actuation nasal spray 1 [place] by Both Nostrils route two (2) times daily as needed for Rhinitis.  finasteride (PROSCAR) 5 mg tablet Take 5 mg by mouth nightly.  fexofenadine (ALLEGRA) 180 mg tablet Take 180 mg by mouth nightly. Plan:  Obtain authorization for testing from insurance company. Report to follow once testing, scoring, and interpretation completed. ? Organic based neurocognitive issues versus mood disorder or combination of same. ? Problems organic, functional, or both? This note will not be viewable in [address]     [address]*2 45 minutes review of history and with patient

## 2019-07-26 ENCOUNTER — OFFICE VISIT (OUTPATIENT)
Dept: NEUROLOGY | Age: 69
End: 2019-07-26

## 2019-07-26 DIAGNOSIS — F32.0 MILD MAJOR DEPRESSION (HCC): ICD-10-CM

## 2019-07-26 DIAGNOSIS — R41.9 COGNITIVE COMPLAINTS WITH NORMAL NEUROPSYCHOLOGICAL EXAM: ICD-10-CM

## 2019-07-26 DIAGNOSIS — F41.8 ANXIETY WITH SOMATIC FEATURES: ICD-10-CM

## 2019-07-26 DIAGNOSIS — F43.10 PTSD (POST-TRAUMATIC STRESS DISORDER): ICD-10-CM

## 2019-07-26 DIAGNOSIS — G31.84 MILD COGNITIVE IMPAIRMENT: Primary | ICD-10-CM

## 2019-07-26 DIAGNOSIS — R45.4 IRRITABILITY AND ANGER: ICD-10-CM

## 2019-07-29 NOTE — PROGRESS NOTES
[address] [facility],[address]  [address]   [place] [address] [facility]    [phone] Office   [phone] Fax      Neuropsychological Evaluation Report    Referral:  PCP    Nabila Rosemary is a 81 y.o. right handed partnered  who was unaccompanied to the initial clinical interview on 7/11/19 . Please refer to his medical records for details pertaining to his history. Briefly, the patient reported that he is currently in school working on another degree in interdisciplinary studies (Physics and Math) and completed an engineering degree. He was a poor student in high school because he would rather do other things. He works at [address] [place] [address] as a . Retired in 2002. 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 He has noticed a decline in cognition. He has short term memory problems. Forgets the content of conversations. He has noticed that his reasoning skills have declined. 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 For the past five years, he is just not quite as sharp. Today, he was listening to a lecture in mathematics, linear algebra, and was listening to the professor and watching him write on the screen and he couldn't comprehend what was being said. He is independent for medications, finances, driving, day-to-day chores, ADLs. Last known seizure was at age 24. He has not been on medication for epilepsy since that time. He has a history of head injuries as well. He was working in [employer] in college and does not recall exactly what happened but he was found lying in a pool of blood in the aisle. Spent 3-5 days in the hospital due to concussions. He studied martial arts for 50 years and has been hit in the head multiple times without LOC. Sleep is poor. HE has been diagnosed with mild sleep apnea.  Will sleep 45 minutes to an hour, vivid dream, and wake up, and it happens several times a night. Does not get restorative sleep. No CPAP or BiPAP. Appetite is too good. There has been a change in sense of taste. Stopped enjoying chocolate  - taste of same. Anxious about what is going on, and gets anxious about day-to-day things, and compensates by working harder. He worries about the state of the world. He has never had psychotherapy for anxiety nor has he been on medication for anxiety. He did see doctor for claustrophobia and was given prn medication for same, but made him groggy so stopped taking it. There is a family history of dementia. His brother was never \"quite right. \"  His mother became very strange toward the end of her life, and she  at 68. Father  at age 48. No previous neuropsych. Neuropsychological Mental Status Exam (NMSE):  Historian: Good  Praxis: No UE apraxia  R/L Orientation: Intact to self and to other  Dress: within normal limits   Weight: within normal limits   Appearance/Hygiene: within normal limits   Gait: within normal limits   Assistive Devices: None  Mood: within normal limits   Affect: within normal limits   Comprehension: within normal limits   Thought Process: within normal limits   Expressive Language: within normal limits   Receptive Language: within normal limits   Motor:  No cognitive or motor perseveration  ETOH: Occasionally   Tobacco: Denied  Illicit: Denied, not since 76s.     SI/HI: Denied  Psychosis: Denied  Insight: Within normal limits  Judgment: Within normal limits  Other Psych:      Past Medical History:   Diagnosis Date    Arthritis     entire body    Cancer ([facility] [address]     skin cancers - removed from right arm and right shoulder    Chronic pain     plantar fasciitis (bilateral)    GERD (gastroesophageal reflux disease)     Glaucoma     Ill-defined condition     kidney stones bilateral    Other ill-defined conditions(489.89)     urticaria    Other ill-defined conditions(799.89) 12/2012    kidney stones    Other ill-defined conditions(799.89)     hyperlipidemia    Other ill-defined conditions(799.89) 2011    glaucoma    Other ill-defined conditions(799.89)     BPH    Psychiatric disorder     anxiety    Seizures ([facility] [address]     childhood- last at age 24    Sleep apnea        Past Surgical History:   Procedure Laterality Date    HX HEENT      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    HX HERNIA REPAIR  1023    umbilical    HX HERNIA REPAIR  1957    inguinal -left    HX KNEE ARTHROSCOPY Right     x3    HX KNEE ARTHROSCOPY Left     x3    HX KNEE REPLACEMENT  2014    right    HX ORTHOPAEDIC      left foot tendon to help with plantar fasciitis    HX ROTATOR CUFF REPAIR  12/2016    left    HX TONSILLECTOMY  1964    HX TRABECULECTOMY      HX VASECTOMY  1983       No Known Allergies    Family History   Problem Relation Age of Onset    Diabetes Mother     Depression Mother     Heart Attack Mother     Gout Mother     Obesity Mother     Hypertension Mother     Heart Attack Father     Alcohol abuse Father     No Known Problems Sister     Cancer Brother     No Known Problems Brother        Social History     Tobacco Use    Smoking status: Never Smoker    Smokeless tobacco: Never Used   Substance Use Topics    Alcohol use: Yes     Comment: 1 drink per week - rarely    Drug use: No       Current Outpatient Medications   Medication Sig Dispense Refill    tadalafil (CIALIS) 5 mg tablet Take 1 Tab by mouth nightly. Hold for two weeks.  oxyCODONE IR (ROXICODONE) 5 mg immediate release tablet Take 1-2 Tabs by mouth every three (3) hours as needed. Max Daily Amount: 80 mg. 80 Tab 0    cholecalciferol, vitamin D3, (VITAMIN D3) 2,000 unit tab Take  by mouth daily. Ordered per Dr. Chen Poster for pre-op vitamin D result.  gabapentin (NEURONTIN) 300 mg capsule Take 300 mg by mouth three (3) times daily.       atorvastatin (LIPITOR) 40 mg tablet Take 20 mg by mouth nightly.  POLYVINYL ALCOHOL/POVIDONE (ARTIFICIAL TEARS OP) Apply  to eye as needed.  diclofenac EC (VOLTAREN) 75 mg EC tablet Take 75 mg by mouth two (2) times a day.  busPIRone (BUSPAR) 5 mg tablet Take 5 mg by mouth two (2) times a day.  ASPIRIN PO Take 81 mg by mouth. Enteric coated      MELATONIN-PYRIDOXINE, VIT B6, SL by Aerosolization route as needed. At bedtime      niacin 250 mg tablet Take 250 mg by mouth three (3) times daily (with meals).  fluticasone (FLONASE) 50 mcg/actuation nasal spray 1 Badger by Both Nostrils route two (2) times daily as needed for Rhinitis.  finasteride (PROSCAR) 5 mg tablet Take 5 mg by mouth nightly.  fexofenadine (ALLEGRA) 180 mg tablet Take 180 mg by mouth nightly. Plan:  Obtain authorization for testing from insurance company. Report to follow once testing, scoring, and interpretation completed. ? Organic based neurocognitive issues versus mood disorder or combination of same. ? Problems organic, functional, or both? This note will not be viewable in [address] [address]*1 45 minutes review of history and with patient    Neuropsychological Test Results  Patient Testing 7/26/19 Report Completed 7/29/19  A Psychometrist Assisted w/ portions of this evaluation while under my direct  supervision    The following evaluation procedures/tests were administered:      Neuropsychologist Administered/Interpreted:  Neuropsychological Mental Status Exam, Revised Memory & Behavior Checklist,  Mini Mental Status Exam, Clock Drawing Test, Test Of Premorbid Functioning, Edgardo-Melzack Pain Questionnaire, History Taking  & Clinical Interview With The Patient, LIVAN, CPT-II, WCST, Review Of Available Records.     Psychometrist Administered under Neuropsychologist Supervision & Neuropsychologist Interpreted:  Verbal Fluency Tests, [facility] - Revised, Trailmaking Test Parts A & B, Wechsler Adult Intelligence Scale - IV, [place] Verbal Learning Test - 3, Grooved Pegboard, Larkin Depression Inventory - II, Larkin Anxiety Inventory. Test Findings:  Test Findings:  Note:  The patients raw data have been compared with currently available norms which include demographic corrections for age, gender, and/or education. Sometimes, the patients scores are compared to demographically similar individuals as close to the patients age, education level, etc., as possible. \"Average\" is viewed as being +/- 1 standard deviation (SD) from the stated mean for a particular test score. \"Low average\" is viewed as being between 1 and 2 SD below the mean, and above average is viewed as being 1 and 2 SD above the mean. Scores falling in the borderline range (between 1-1/2 and 2 SD below the mean) are viewed with particular attention as to whether they are normal or abnormal neurocognitive test scores. Other methods of inference in analyzing the test data are also utilized, including the pattern and range of scores in the profile, bilateral motor functions, and the presence, if any, of pathognomonic signs. Behaviorally, the patient was friendly and cooperative and appeared motivated to perform well during this examination. Within this context, the results of this evaluation are viewed as a valid reflection of the patients actual neurocognitive and emotional status. His MMSE score of 29/30 correct was normal.   In this regard, he was not oriented to county. Clock drawing was normal.        His structured word list fluency, as assessed by the FAS Test, was within the above average range with a T score of 55. Category fluency was within the above average range with a T score of 55. Confrontation naming ability, as assessed by the [facility] Test - Revised, was within the above average range at 60/60 correct (T = 72).   This pattern of performance is not indicative of a patient who is at increased risk for day-to-day problems with verbal fluency and confrontation naming was normal.       The patient was administered the [facility] Continuous Performance Test - III,a computer administered test of sustained attention, and review of the subscales within this instrument did not reveal clinically significant concerns for inattentiveness or impulsivity. This pattern of performance is not indicative of a patient who is at increased risk for day-to-day problems with sustained visual attention/concentration. The patient is not  showing problems with working memory capacity (77th %ile) or  processing speed (93rd %ile) on the WAIS-IV. His Verbal Comprehension Index score of 122 (93rd %ile)w as within the superior range. His Perceptual Reasoning Index score of 121 was also within the superior range . These scores do not reflect a decline in functioning based on an assessment of premorbid functioning. The patient was administered the [place] Verbal Learning Test  - 3 and generated a normal to above normal range and positive learning curve over five repeated auditory word list learning trials. An interference trial was within the normal range. Free and cued, short and long delayed recall were all well within or above the normal range. Recognition and forced choice recall were normal.  This pattern of performance is not indicative of a patient who is at increased risk for day-to-day problems with auditory learning and memory. Simple timed visual motor sequencing (Trailmaking Test Part A) was within the above average range with a T score of 72. His performance on a similar, but more complex task of timed visual motor sequencing (Trailmaking Test Part B) was within the above average range with a T score of 72. On additional assessment of executive functioning [facility]), the patient quickly and efficiently completed 5/6 categories on this test (>16th %ile).    Taken together, this pattern of performance is not indicative of a patient who is at increased risk for day-to-day problems with executive functioning. Fine motor dexterity was within the normal range bilaterally. This pattern of performance is not  indicative of a patient who is at increased risk for day-to-day problems with bilateral motor dexterity. The patient rated his current level of pain as \"1/5 - Mild\" on the Edgardo-Melzack Pain Questionnaire. He reported pain in his shoulders, left elbow, hands, and knees. His Larkin Depression Inventory- II score of 13 was within the normal range. His Larkin Anxiety Inventory score of 17 reflected moderate anxiety. The patient was administered the Personality Assessment Inventory and generated a valid profile for interpretation. Within this context, there is very strong support for anxiety and somatization, and PTSD is very likely. He is likely to be a hypervigilant individual who questions the motives of those around him. Mild depression is present. He can be emotionally labile, with episodes of poorly controlled anger. Interpersonally, he is pragmatic and independent, and he perceives his level of social support as low. This pattern appears to be part of a more pervasive pattern of dissatisfaction with the behavior and intentions of others. Marked anger management issues are present. He reports periodic and perhaps transient thoughts of self-harm, and denied active plan or intent with me  His level of treatment motivation is somewhat low. Despite his recognition that a number of areas of his life are not going well, his responses suggest some resistance to the idea that personal changes are needed. He may be defensive at times, and any impasse may need to be handled with particular care. Impressions & Recommendations: This patient generated a fully normal to above normal range Neuropsychological Evaluation with respect to neurocognitive functioning.   In this regard, his performance across all neurocognitive domains assessed, including mental status, verbal fluency, confrontation naming, sustained attention, processing speed, working memory, perceptual reasoning, verbal comprehension, bilateral fine motor dexterity, auditory learning, auditory memory, and executive functioning remain well within or above the normal range. IQ is superior and there is no evidence of a decline from estimated premorbid functioning levels. From an emotional standpoint, there is considerable anxiety with somatic features, more mild depression with passive suicidal thinking, PTSD, and anger management issues. Thankfully, this profile is not consistent with even early signs of MCI or dementia or other neurocognitive disorder. Instead, this is an individual who is aging who is experiencing significant emotional distress. As such, I recommend consultation with psychiatry for medication management for anxiety and depression along with active engagement in psychotherapy with a Psychologist who has experience in working with individuals who are profoundly bright. Monitor for any escalation in aggression and/or suicidal type thinking. I hope he is very reassured by the positive nature of the neurocognitive test results. Hopefully, an improvement in mood will improve the day-to-day cognitive difficulties he has been experiencing. Stay active mentally, physically, and socially. I am not concerned about competency, driving, day-to-day supervision, etc.  Baseline now established. Follow up prn. Clinical correlation is, of course, indicated. I will discuss these findings with the patient and family when they follow up with me in the near future. A follow up Neuropsychological Evaluation is indicated on a prn basis.       DIAGNOSES: The Referral Dx of MCI - IS NOT SUPPORTED    Cognitive Complaints with Normal Neuropsychological Evaluation    PTSD    Anxiety with Somatic Features    Mild Major Depression       The above information is based upon information currently available to me. If there is any additional information of which I am currently unaware, I would be more than happy to review it upon having it made available to me. Thank you for the opportunity to see this interesting individual.     Sincerely,       Lee Riley. Blake Wood, PsyD, EdS      Attachments:  IQ Test Results (In Media Section Of This EMR)    Cc: PCP    Time Documentation:    50663 x 1: Neurobehavioral Status Exam/Clinical Interview: (1 hour, (already billed on first date of service)    38859 x 1: Neurobehavioral Status Exam, Additional: (35 additional minutes, see above)     96138 x 1  96139 x 5 Test Administration/Data Gathering By Technician: (3 hours). 97207 x 1 (first 30 minutes), 35618 x 5 (each additional 30 minutes)    96132 x 1  96133 x 1 Testing Evaluation Services by Neuropsychologist (1 hour, 50 minutes) 96132 x 1 (first hour), 96133 x 1 (50 minutes)    Definitions:      60316/76828:  Neurobehavioral Status Exam, Clinical interview. Clinical assessment of thinking, reasoning and judgment, by neuropsychologist, both face to face time with patient and time interpreting those test results and reporting, first and subsequent hours)    57991/54000: Neuropsychological Test Administration by Technician/Psychometrist, first 30 minutes and each additional 30 minutes. The above includes: Record review. Review of history provided by patient. Review of collaborative information. Testing by Clinician. Review of raw data. Scoring. Report writing of individual tests administered by Clinician. Integration of individual tests administered by psychometrist with NSE/testing by clinician, review of records/history/collaborative information, case Conceptualization, treatment planning, clinical decision making, report writing, coordination Of Care.  Psychometry test codes as time spent by psychometrist administering and scoring neurocognitive/psychological tests under supervision of neuropsychologist.  Integral services including scoring of raw data, data interpretation, case conceptualization, report writing etcetera were initiated after the patient finished testing/raw data collected and was completed on the date the report was signed.

## 2019-10-11 ENCOUNTER — OFFICE VISIT (OUTPATIENT)
Dept: NEUROLOGY | Age: 69
End: 2019-10-11

## 2019-10-11 DIAGNOSIS — F43.10 PTSD (POST-TRAUMATIC STRESS DISORDER): Primary | ICD-10-CM

## 2019-10-11 DIAGNOSIS — F41.8 ANXIETY WITH SOMATIC FEATURES: ICD-10-CM

## 2019-10-11 DIAGNOSIS — Z87.898 HISTORY OF SEIZURES: ICD-10-CM

## 2019-10-11 DIAGNOSIS — R41.9 COGNITIVE COMPLAINTS WITH NORMAL NEUROPSYCHOLOGICAL EXAM: ICD-10-CM

## 2019-10-11 DIAGNOSIS — R45.4 IRRITABILITY AND ANGER: ICD-10-CM

## 2019-10-11 NOTE — PROGRESS NOTES
Psychoeducational and supportive psychotherapy and feedback session with the patient today. I reviewed the results of the recent Neuropsychological Evaluation, including discussing individual tests as well as patient's areas of neurocognitive strength versus weakness. Prior to seeing the patient I reviewed the records, including the previously completed report, the records in [place], and any updated visits from other providers since I saw the patient last.      We discussed, in detail, the following: This patient generated a fully normal to above normal range Neuropsychological Evaluation with respect to neurocognitive functioning. In this regard, his performance across all neurocognitive domains assessed, including mental status, verbal fluency, confrontation naming, sustained attention, processing speed, working memory, perceptual reasoning, verbal comprehension, bilateral fine motor dexterity, auditory learning, auditory memory, and executive functioning remain well within or above the normal range. IQ is superior and there is no evidence of a decline from estimated premorbid functioning levels. From an emotional standpoint, there is considerable anxiety with somatic features, more mild depression with passive suicidal thinking, PTSD, and anger management issues.                   Thankfully, this profile is not consistent with even early signs of MCI or dementia or other neurocognitive disorder. Instead, this is an individual who is aging who is experiencing significant emotional distress. As such, I recommend consultation with psychiatry for medication management for anxiety and depression along with active engagement in psychotherapy with a Psychologist who has experience in working with individuals who are profoundly bright. Monitor for any escalation in aggression and/or suicidal type thinking. I hope he is very reassured by the positive nature of the neurocognitive test results.   Hopefully, an improvement in mood will improve the day-to-day cognitive difficulties he has been experiencing. Stay active mentally, physically, and socially. I am not concerned about competency, driving, day-to-day supervision, etc.  Baseline now established. Follow up prn. Clinical correlation is, of course, indicated.                   I will discuss these findings with the patient and family when they follow up with me in the near future. A follow up Neuropsychological Evaluation is indicated on a prn basis.       DIAGNOSES: The Referral Dx of MCI - IS NOT SUPPORTED                          Cognitive Complaints with Normal Neuropsychological Evaluation                          PTSD                          Anxiety with Somatic Features                          Mild Major Depression      Education was provided regarding my diagnostic impressions, and we discussed treatment plan/options. I also answered numerous questions related to the clinical findings, including discussing various methods to improve cognition and mood. Counseling provided regarding mood and cognition. CBT and supportive psychotherapy techniques were utilized. Supportive/Cognitive Behavioral/Solution Focused psychotherapy provided  Discussed rational versus irrational thinking patterns and their consequences. Discussed healthy/adaptive and unhealthy/maladaptive coping. The patient needs to follow with psychiatry and psychology ass noted above.     40 minutes

## 2020-05-27 ENCOUNTER — HOSPITAL ENCOUNTER (OUTPATIENT)
Dept: MRI IMAGING | Age: 70
Discharge: HOME | End: 2020-05-27

## 2020-05-27 DIAGNOSIS — E22.1 HYPERPROLACTINEMIA (HCC): ICD-10-CM

## 2020-06-22 ENCOUNTER — HOSPITAL ENCOUNTER (OUTPATIENT)
Dept: MRI IMAGING | Age: 70
Discharge: HOME | End: 2020-06-22
Payer: MEDICARE

## 2020-06-22 PROCEDURE — A9575 INJ GADOTERATE MEGLUMI 0.1ML: HCPCS

## 2020-06-22 PROCEDURE — 70553 MRI BRAIN STEM W/O & W/DYE: CPT

## 2020-06-22 PROCEDURE — 74011250636 HC RX REV CODE- 250/636

## 2020-06-22 RX ORDER — GADOTERATE MEGLUMINE 376.9 MG/ML
18 INJECTION INTRAVENOUS
Status: COMPLETED | OUTPATIENT
Start: 2020-06-22 | End: 2020-06-22

## 2020-06-22 RX ADMIN — GADOTERATE MEGLUMINE 18 ML: 376.9 INJECTION INTRAVENOUS at 08:55

## 2020-08-03 ENCOUNTER — OFFICE VISIT (OUTPATIENT)
Dept: NEUROLOGY | Age: 70
End: 2020-08-03
Payer: MEDICARE

## 2020-08-03 DIAGNOSIS — Z87.898 HISTORY OF SEIZURES: ICD-10-CM

## 2020-08-03 DIAGNOSIS — F41.8 ANXIETY WITH SOMATIC FEATURES: ICD-10-CM

## 2020-08-03 DIAGNOSIS — R41.3 SHORT-TERM MEMORY LOSS: ICD-10-CM

## 2020-08-03 DIAGNOSIS — F32.0 MILD MAJOR DEPRESSION (HCC): ICD-10-CM

## 2020-08-03 DIAGNOSIS — G31.84 MILD COGNITIVE IMPAIRMENT: Primary | ICD-10-CM

## 2020-08-03 DIAGNOSIS — F43.10 PTSD (POST-TRAUMATIC STRESS DISORDER): ICD-10-CM

## 2020-08-03 DIAGNOSIS — R45.4 IRRITABILITY AND ANGER: ICD-10-CM

## 2020-08-03 PROCEDURE — 90791 PSYCH DIAGNOSTIC EVALUATION: CPT | Performed by: CLINICAL NEUROPSYCHOLOGIST

## 2020-08-03 NOTE — PROGRESS NOTES
Virtual Visit      [address] [facility],5Th [address]  [address]   [address] [place] [address] [place] [address] Drive   [phone] Office   [phone] Fax      Neuropsychology    Exam # 2    Initial Diagnostic Interview Note      Referral:  PCP    Judy Garcia is a 78 y.o. right handed  male who was accompanied to the initial clinical interview on 8/3/20 . Please refer to his medical records for details pertaining to his history. At the start of the appointment, I reviewed the patient's [facility] Epic Chart (including Media scanned in from previous providers) for the active Problem List, all pertinent Past Medical Hx, medications, recent radiologic and laboratory findings. In addition, I reviewed pt's documented Immunization Record and Encounter History. Pursuant to the emergency declaration under the [address] [place] [address], [facility] waiver authority and the [employer] Act, this Virtual  Visit (audiovisual) was conducted, with appropriate consent obtained, to reduce the patient's risk of exposure to COVID-19 and provide continuity of care   Services were provided in this manner to substitute for in-person clinic visit. The originating site is the patient's home and the distance site is [facility] at the [place] [address]. These types of teleneuropsychology/telehealth/telemedicine visits were authorized by the President of the [place], though I/we cannot guarantee what a third party payor will do reimbursement/coverage wise. I indicated that I would evaluate the patient and recommend diagnostics and treatment based on my assessment and impressions, and that our sessions are not being recorded and that personal health information is protected to the best of our abilities.             When I saw him last:      Nga Cruz is a 81 y.o. right handed partnered  who was unaccompanied to the initial clinical interview on 7/11/19 . Please refer to his medical records for details pertaining to his history. Briefly, the patient reported that he is currently in school working on another degree in interdisciplinary studies (Physics and Math) and completed an engineering degree. He was a poor student in high school because he would rather do other things. He works at [employer] as a . Retired in 2002. 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 He has noticed a decline in cognition. He has short term memory problems. Forgets the content of conversations. He has noticed that his reasoning skills have declined. 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 For the past five years, he is just not quite as sharp. Today, he was listening to a lecture in mathematics, linear algebra, and was listening to the professor and watching him write on the screen and he couldn't comprehend what was being said. He is independent for medications, finances, driving, day-to-day chores, ADLs. Last known seizure was at age 24. He has not been on medication for epilepsy since that time. He has a history of head injuries as well. He was working in [employer] in college and does not recall exactly what happened but he was found lying in a pool of blood in the aisle. Spent 3-5 days in the hospital due to concussions. He studied martial arts for 50 years and has been hit in the head multiple times without LOC. Sleep is poor. HE has been diagnosed with mild sleep apnea. Will sleep 45 minutes to an hour, vivid dream, and wake up, and it happens several times a night. Does not get restorative sleep. No CPAP or BiPAP. Appetite is too good. There has been a change in sense of taste. Stopped enjoying chocolate  - taste of same.   Anxious about what is going on, and gets anxious about day-to-day things, and compensates by working harder. He worries about the state of the world. He has never had psychotherapy for anxiety nor has he been on medication for anxiety. He did see doctor for claustrophobia and was given prn medication for same, but made him groggy so stopped taking it.       There is a family history of dementia. His brother was never \"quite right. \"  His mother became very strange toward the end of her life, and she  at 68. Father  at age 48.       No previous neuropsych.       My Dx in 2019 included: This patient generated a fully normal to above normal range Neuropsychological Evaluation with respect to neurocognitive functioning. In this regard, his performance across all neurocognitive domains assessed, including mental status, verbal fluency, confrontation naming, sustained attention, processing speed, working memory, perceptual reasoning, verbal comprehension, bilateral fine motor dexterity, auditory learning, auditory memory, and executive functioning remain well within or above the normal range. IQ is superior and there is no evidence of a decline from estimated premorbid functioning levels. From an emotional standpoint, there is considerable anxiety with somatic features, more mild depression with passive suicidal thinking, PTSD, and anger management issues.                   Thankfully, this profile is not consistent with even early signs of MCI or dementia or other neurocognitive disorder. Instead, this is an individual who is aging who is experiencing significant emotional distress. As such, I recommend consultation with psychiatry for medication management for anxiety and depression along with active engagement in psychotherapy with a Psychologist who has experience in working with individuals who are profoundly bright. Monitor for any escalation in aggression and/or suicidal type thinking.   I hope he is very reassured by the positive nature of the neurocognitive test results. Hopefully, an improvement in mood will improve the day-to-day cognitive difficulties he has been experiencing. Stay active mentally, physically, and socially. I am not concerned about competency, driving, day-to-day supervision, etc.  Baseline now established. Follow up prn. Clinical correlation is, of course, indicated.                   I will discuss these findings with the patient and family when they follow up with me in the near future. A follow up Neuropsychological Evaluation is indicated on a prn basis.       DIAGNOSES: The Referral Dx of MCI - IS NOT SUPPORTED                          Cognitive Complaints with Normal Neuropsychological Evaluation                          PTSD                          Anxiety with Somatic Features                          Mild Major Depression       In the past year, he has not been working due to [place]. He has noticed a progressive decline in short term memory. Last week, spent two hours watching a movie and could not recall any details about the movie. Couldn't say what it was about. He did recall he had seen a movie. But while he was conversing he had no memory issue either. No new stroke or seizures, meningitis/encephalitis, PENNIE Fever, Lupus. Lyme. He has noticed a significant decline in attention and focus, word finding, comprehension. He will be in a conversations, talking to someone, and there will be a word he uses and just cannot recall it. He remains independent for driving, medications, finances, day-to-day chores, etc.  No changes in sense of taste or smell. No changes in appetite. Sleep is problematic. Recent Covid Testing negative. He has no major changes in mood - in fact, doing pretty well. No counseling or psychiatrist.       [address] [facility]: MRI BRAIN W WO CONT     TECHNIQUE: Sagittal and axial T1-weighted images axial FLAIR, T2-weighted,  diffusion weighted, precontrast images of the brain.  Thin section pre and  postcontrast T1-weighted images of the pituitary gland. Axial postcontrast  images of the head. The study was performed on 0.7 [place] open MRI unit. Because  of technical difficulties, the initial images were obtained on 5/27/2020 and the  study was completed on 6/22/2020, at which time the images are interpreted.         IV CONTRAST:  18 cc Dotarem     INDICATION:  Hyperprolactinemia     COMPARISON:  Brain MRI of 12/1/2017     FINDINGS:  BRAIN PARENCHYMA:  Mild scattered foci of FLAIR/T2 hyperintensity in the  cerebral white matter, considered unremarkable for age. There are demonstrated  than on the previous study, probably due to technical differences. No acute or  chronic infarct. No masses or abnormal enhancement. INTRACRANIAL HEMORRHAGE: None. CSF SPACES:  Normal in size and morphology for the patient's age. BASAL CISTERNS:  Patent. MIDLINE SHIFT: None. VASCULAR SYSTEM:  Normal flow voids. PARANASAL SINUSES AND MASTOID AIR CELLS:  No significant opacification. VISUALIZED ORBITS:  No significant abnormalities. VISUALIZED UPPER CERVICAL SPINE:  No significant abnormalities. SELLA:  Thin section images obtained. Normal pituitary gland height and signal  intensity. Normal posterior pituitary bright spot. Infundibulum midline. No  focal abnormalities. SKULL BASE:  No significant abnormalities. Cerebellar tonsils in normal  position.   CALVARIUM:  Intact.         IMPRESSION  IMPRESSION:       No significant abnormalities on MRI of the brain and pituitary.       Neuropsychological Mental Status Exam (NMSE):  Historian: Good  Praxis: No UE apraxia  R/L Orientation: Intact to self and to other  Dress: within normal limits   Weight: within normal limits   Appearance/Hygiene: within normal limits   Gait: within normal limits   Assistive Devices: None  Mood: within normal limits   Affect: within normal limits   Comprehension: within normal limits   Thought Process: within normal limits   Expressive Language: within normal limits Receptive Language: within normal limits   Motor:  No cognitive or motor perseveration  ETOH: Occasionally   Tobacco: Denied  Illicit: Denied, not since 76s.     SI/HI: Denied  Psychosis: Denied  Insight: Within normal limits  Judgment: Within normal limits  Other Psych:           Past Medical History:   Diagnosis Date    Arthritis     entire body    Cancer ([facility] [address]     skin cancers - removed from right arm and right shoulder    Chronic pain     plantar fasciitis (bilateral)    GERD (gastroesophageal reflux disease)     Glaucoma     Ill-defined condition     kidney stones bilateral    Other ill-defined conditions(799.89)     urticaria    Other ill-defined conditions(799.89) 12/2012    kidney stones    Other ill-defined conditions(799.89)     hyperlipidemia    Other ill-defined conditions(799.89) 2011    glaucoma    Other ill-defined conditions(799.89)     BPH    Psychiatric disorder     anxiety    Seizures ([facility] [address]     childhood- last at age 24    Sleep apnea        Past Surgical History:   Procedure Laterality Date    HX HEENT      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    HX HERNIA REPAIR  9683    umbilical    HX HERNIA REPAIR  1957    inguinal -left    HX KNEE ARTHROSCOPY Right     x3    HX KNEE ARTHROSCOPY Left     x3    HX KNEE REPLACEMENT  2014    right    HX ORTHOPAEDIC      left foot tendon to help with plantar fasciitis    HX ROTATOR CUFF REPAIR  12/2016    left    HX TONSILLECTOMY  1964    HX TRABECULECTOMY      HX VASECTOMY  1983       No Known Allergies    Family History   Problem Relation Age of Onset    Diabetes Mother     Depression Mother     Heart Attack Mother     Gout Mother     Obesity Mother     Hypertension Mother     Heart Attack Father     Alcohol abuse Father     No Known Problems Sister     Cancer Brother     No Known Problems Brother        Social History     Tobacco Use    Smoking status: Never Smoker    Smokeless tobacco: Never Used   Substance Use Topics    Alcohol use: Yes     Comment: 1 drink per week - rarely    Drug use: No       Current Outpatient Medications   Medication Sig Dispense Refill    tadalafil (CIALIS) 5 mg tablet Take 1 Tab by mouth nightly. Hold for two weeks.  oxyCODONE IR (ROXICODONE) 5 mg immediate release tablet Take 1-2 Tabs by mouth every three (3) hours as needed. Max Daily Amount: 80 mg. 80 Tab 0    cholecalciferol, vitamin D3, (VITAMIN D3) 2,000 unit tab Take  by mouth daily. Ordered per Dr. Thai Hernandez for pre-op vitamin D result.  gabapentin (NEURONTIN) 300 mg capsule Take 300 mg by mouth three (3) times daily.  atorvastatin (LIPITOR) 40 mg tablet Take 20 mg by mouth nightly.  POLYVINYL ALCOHOL/POVIDONE (ARTIFICIAL TEARS OP) Apply  to eye as needed.  diclofenac EC (VOLTAREN) 75 mg EC tablet Take 75 mg by mouth two (2) times a day.  busPIRone (BUSPAR) 5 mg tablet Take 5 mg by mouth two (2) times a day.  ASPIRIN PO Take 81 mg by mouth. Enteric coated      MELATONIN-PYRIDOXINE, VIT B6, SL by Aerosolization route as needed. At bedtime      niacin 250 mg tablet Take 250 mg by mouth three (3) times daily (with meals).  fluticasone (FLONASE) 50 mcg/actuation nasal spray 1 [place] by Both Nostrils route two (2) times daily as needed for Rhinitis.  finasteride (PROSCAR) 5 mg tablet Take 5 mg by mouth nightly.  fexofenadine (ALLEGRA) 180 mg tablet Take 180 mg by mouth nightly. Plan:  Obtain authorization for testing from insurance company. Report to follow once testing, scoring, and interpretation completed. ? Organic based neurocognitive issues versus mood disorder or combination of same. ? Problems organic, functional, or both? This note will not be viewable in [address]

## 2020-09-21 ENCOUNTER — OFFICE VISIT (OUTPATIENT)
Dept: NEUROLOGY | Age: 70
End: 2020-09-21
Payer: MEDICARE

## 2020-09-21 VITALS — TEMPERATURE: 97 F

## 2020-09-21 DIAGNOSIS — F43.10 PTSD (POST-TRAUMATIC STRESS DISORDER): ICD-10-CM

## 2020-09-21 DIAGNOSIS — R45.4 IRRITABILITY AND ANGER: ICD-10-CM

## 2020-09-21 DIAGNOSIS — G31.84 MILD COGNITIVE IMPAIRMENT: Primary | ICD-10-CM

## 2020-09-21 DIAGNOSIS — F32.1 MODERATE MAJOR DEPRESSION (HCC): ICD-10-CM

## 2020-09-21 DIAGNOSIS — Z87.898 HISTORY OF SEIZURES: ICD-10-CM

## 2020-09-21 DIAGNOSIS — Z00.00 NORMAL NEUROPSYCHOLOGICAL EXAM: ICD-10-CM

## 2020-09-21 DIAGNOSIS — F41.9 ANXIETY WITH SOMATIZATION: ICD-10-CM

## 2020-09-21 DIAGNOSIS — F45.0 ANXIETY WITH SOMATIZATION: ICD-10-CM

## 2020-09-21 PROCEDURE — 96138 PSYCL/NRPSYC TECH 1ST: CPT | Performed by: CLINICAL NEUROPSYCHOLOGIST

## 2020-09-21 PROCEDURE — 96139 PSYCL/NRPSYC TST TECH EA: CPT | Performed by: CLINICAL NEUROPSYCHOLOGIST

## 2020-09-21 PROCEDURE — 96137 PSYCL/NRPSYC TST PHY/QHP EA: CPT | Performed by: CLINICAL NEUROPSYCHOLOGIST

## 2020-09-21 PROCEDURE — 96132 NRPSYC TST EVAL PHYS/QHP 1ST: CPT | Performed by: CLINICAL NEUROPSYCHOLOGIST

## 2020-09-21 PROCEDURE — 96136 PSYCL/NRPSYC TST PHY/QHP 1ST: CPT | Performed by: CLINICAL NEUROPSYCHOLOGIST

## 2020-09-21 PROCEDURE — 96133 NRPSYC TST EVAL PHYS/QHP EA: CPT | Performed by: CLINICAL NEUROPSYCHOLOGIST

## 2020-09-22 NOTE — PROGRESS NOTES
Virtual Visit      [address] [facility],[address]  [address]   [address] [place] [address] [facility]   [place]    [phone] Office   [phone] Fax      Neuropsychological Evaluation Report    Exam # 2      Referral:  PCP    Bon Mead is a 78 y.o. right handed  male who was unaccompanied to the initial clinical interview on 8/3/20 . Please refer to his medical records for details pertaining to his history. At the start of the appointment, I reviewed the patient's [facility] Epic Chart (including Media scanned in from previous providers) for the active Problem List, all pertinent Past Medical Hx, medications, recent radiologic and laboratory findings. In addition, I reviewed pt's documented Immunization Record and Encounter History. Pursuant to the emergency declaration under the [facility] waiver authority and the Business [employer] and [employer] Act, this Virtual  Visit (audiovisual) was conducted, with appropriate consent obtained, to reduce the patient's risk of exposure to COVID-19 and provide continuity of care   Services were provided in this manner to substitute for in-person clinic visit. The originating site is the patient's home and the distance site is [facility] at the [facility]. These types of teleneuropsychology/telehealth/telemedicine visits were authorized by the President of the [place], though I/we cannot guarantee what a third party payor will do reimbursement/coverage wise. I indicated that I would evaluate the patient and recommend diagnostics and treatment based on my assessment and impressions, and that our sessions are not being recorded and that personal health information is protected to the best of our abilities.             When I saw him last:      Richardson Barger is a 81 y.o. right handed partnered  who was unaccompanied to the initial clinical interview on 7/11/19 . Please refer to his medical records for details pertaining to his history. Briefly, the patient reported that he is currently in school working on another degree in interdisciplinary studies (Physics and Math) and completed an engineering degree. He was a poor student in high school because he would rather do other things. He works at [email or web address] as a . Retired in 2002. He now tutors and works as a TA for [employer] He has noticed a decline in cognition. He has short term memory problems. Forgets the content of conversations. He has noticed that his reasoning skills have declined. 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 For the past five years, he is just not quite as sharp. Today, he was listening to a lecture in mathematics, linear algebra, and was listening to the professor and watching him write on the screen and he couldn't comprehend what was being said. He is independent for medications, finances, driving, day-to-day chores, ADLs. Last known seizure was at age 24. He has not been on medication for epilepsy since that time. He has a history of head injuries as well. He was working in [employer] in college and does not recall exactly what happened but he was found lying in a pool of blood in the aisle. Spent 3-5 days in the hospital due to concussions. He studied martial arts for 50 years and has been hit in the head multiple times without LOC. Sleep is poor. HE has been diagnosed with mild sleep apnea. Will sleep 45 minutes to an hour, vivid dream, and wake up, and it happens several times a night. Does not get restorative sleep. No CPAP or BiPAP. Appetite is too good. There has been a change in sense of taste. Stopped enjoying chocolate  - taste of same.   Anxious about what is going on, and gets anxious about day-to-day things, and compensates by working harder. He worries about the state of the world. He has never had psychotherapy for anxiety nor has he been on medication for anxiety. He did see doctor for claustrophobia and was given prn medication for same, but made him groggy so stopped taking it.       There is a family history of dementia. His brother was never \"quite right. \"  His mother became very strange toward the end of her life, and she  at 68. Father  at age 48.       No previous neuropsych.       My Dx in 2019 included: This patient generated a fully normal to above normal range Neuropsychological Evaluation with respect to neurocognitive functioning. In this regard, his performance across all neurocognitive domains assessed, including mental status, verbal fluency, confrontation naming, sustained attention, processing speed, working memory, perceptual reasoning, verbal comprehension, bilateral fine motor dexterity, auditory learning, auditory memory, and executive functioning remain well within or above the normal range. IQ is superior and there is no evidence of a decline from estimated premorbid functioning levels. From an emotional standpoint, there is considerable anxiety with somatic features, more mild depression with passive suicidal thinking, PTSD, and anger management issues.                   Thankfully, this profile is not consistent with even early signs of MCI or dementia or other neurocognitive disorder. Instead, this is an individual who is aging who is experiencing significant emotional distress. As such, I recommend consultation with psychiatry for medication management for anxiety and depression along with active engagement in psychotherapy with a Psychologist who has experience in working with individuals who are profoundly bright. Monitor for any escalation in aggression and/or suicidal type thinking.   I hope he is very reassured by the positive nature of the neurocognitive test results. Hopefully, an improvement in mood will improve the day-to-day cognitive difficulties he has been experiencing. Stay active mentally, physically, and socially. I am not concerned about competency, driving, day-to-day supervision, etc.  Baseline now established. Follow up prn. Clinical correlation is, of course, indicated.                   I will discuss these findings with the patient and family when they follow up with me in the near future. A follow up Neuropsychological Evaluation is indicated on a prn basis.       DIAGNOSES: The Referral Dx of MCI - IS NOT SUPPORTED                          Cognitive Complaints with Normal Neuropsychological Evaluation                          PTSD                          Anxiety with Somatic Features                          Mild Major Depression       In the past year, he has not been working due to [email or web address]. He has noticed a progressive decline in short term memory. Last week, spent two hours watching a movie and could not recall any details about the movie. Couldn't say what it was about. He did recall he had seen a movie. But while he was conversing he had no memory issue either. No new stroke or seizures, meningitis/encephalitis, PENNIE Fever, Lupus. Lyme. He has noticed a significant decline in attention and focus, word finding, comprehension. He will be in a conversations, talking to someone, and there will be a word he uses and just cannot recall it. He remains independent for driving, medications, finances, day-to-day chores, etc.  No changes in sense of taste or smell. No changes in appetite. Sleep is problematic. Recent Covid Testing negative. He has no major changes in mood - in fact, doing pretty well. No counseling or psychiatrist.       [facility] Standard: MRI BRAIN W WO CONT     TECHNIQUE: Sagittal and axial T1-weighted images axial FLAIR, T2-weighted,  diffusion weighted, precontrast images of the brain.  Thin section pre and  postcontrast T1-weighted images of the pituitary gland. Axial postcontrast  images of the head. The study was performed on 0.7 [place] open MRI unit. Because  of technical difficulties, the initial images were obtained on 5/27/2020 and the  study was completed on 6/22/2020, at which time the images are interpreted.         IV CONTRAST:  18 cc Dotarem     INDICATION:  Hyperprolactinemia     COMPARISON:  Brain MRI of 12/1/2017     FINDINGS:  BRAIN PARENCHYMA:  Mild scattered foci of FLAIR/T2 hyperintensity in the  cerebral white matter, considered unremarkable for age. There are demonstrated  than on the previous study, probably due to technical differences. No acute or  chronic infarct. No masses or abnormal enhancement. INTRACRANIAL HEMORRHAGE: None. CSF SPACES:  Normal in size and morphology for the patient's age. BASAL CISTERNS:  Patent. MIDLINE SHIFT: None. VASCULAR SYSTEM:  Normal flow voids. PARANASAL SINUSES AND MASTOID AIR CELLS:  No significant opacification. VISUALIZED ORBITS:  No significant abnormalities. VISUALIZED UPPER CERVICAL SPINE:  No significant abnormalities. SELLA:  Thin section images obtained. Normal pituitary gland height and signal  intensity. Normal posterior pituitary bright spot. Infundibulum midline. No  focal abnormalities. SKULL BASE:  No significant abnormalities. Cerebellar tonsils in normal  position.   CALVARIUM:  Intact.         IMPRESSION  IMPRESSION:       No significant abnormalities on MRI of the brain and pituitary.       Neuropsychological Mental Status Exam (NMSE):  Historian: Good  Praxis: No UE apraxia  R/L Orientation: Intact to self and to other  Dress: within normal limits   Weight: within normal limits   Appearance/Hygiene: within normal limits   Gait: within normal limits   Assistive Devices: None  Mood: within normal limits   Affect: within normal limits   Comprehension: within normal limits   Thought Process: within normal limits   Expressive Language: within normal limits Receptive Language: within normal limits   Motor:  No cognitive or motor perseveration  ETOH: Occasionally   Tobacco: Denied  Illicit: Denied, not since 76s.     SI/HI: Denied  Psychosis: Denied  Insight: Within normal limits  Judgment: Within normal limits  Other Psych:           Past Medical History:   Diagnosis Date    Arthritis     entire body    Cancer ([facility] [address]     skin cancers - removed from right arm and right shoulder    Chronic pain     plantar fasciitis (bilateral)    GERD (gastroesophageal reflux disease)     Glaucoma     Ill-defined condition     kidney stones bilateral    Other ill-defined conditions(799.89)     urticaria    Other ill-defined conditions(799.89) 12/2012    kidney stones    Other ill-defined conditions(799.89)     hyperlipidemia    Other ill-defined conditions(799.89) 2011    glaucoma    Other ill-defined conditions(799.89)     BPH    Psychiatric disorder     anxiety    Seizures ([facility] [address]     childhood- last at age 24    Sleep apnea        Past Surgical History:   Procedure Laterality Date    HX HEENT      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    HX HERNIA REPAIR  4893    umbilical    HX HERNIA REPAIR  1957    inguinal -left    HX KNEE ARTHROSCOPY Right     x3    HX KNEE ARTHROSCOPY Left     x3    HX KNEE REPLACEMENT  2014    right    HX ORTHOPAEDIC      left foot tendon to help with plantar fasciitis    HX ROTATOR CUFF REPAIR  12/2016    left    HX TONSILLECTOMY  1964    HX TRABECULECTOMY      HX VASECTOMY  1983       No Known Allergies    Family History   Problem Relation Age of Onset    Diabetes Mother     Depression Mother     Heart Attack Mother     Gout Mother     Obesity Mother     Hypertension Mother     Heart Attack Father     Alcohol abuse Father     No Known Problems Sister     Cancer Brother     No Known Problems Brother        Social History     Tobacco Use    Smoking status: Never Smoker    Smokeless tobacco: Never Used   Substance Use Topics    Alcohol use: Yes     Comment: 1 drink per week - rarely    Drug use: No       Current Outpatient Medications   Medication Sig Dispense Refill    tadalafil (CIALIS) 5 mg tablet Take 1 Tab by mouth nightly. Hold for two weeks.  oxyCODONE IR (ROXICODONE) 5 mg immediate release tablet Take 1-2 Tabs by mouth every three (3) hours as needed. Max Daily Amount: 80 mg. 80 Tab 0    cholecalciferol, vitamin D3, (VITAMIN D3) 2,000 unit tab Take  by mouth daily. Ordered per Dr. Vivien Gaitan for pre-op vitamin D result.  gabapentin (NEURONTIN) 300 mg capsule Take 300 mg by mouth three (3) times daily.  atorvastatin (LIPITOR) 40 mg tablet Take 20 mg by mouth nightly.  POLYVINYL ALCOHOL/POVIDONE (ARTIFICIAL TEARS OP) Apply  to eye as needed.  diclofenac EC (VOLTAREN) 75 mg EC tablet Take 75 mg by mouth two (2) times a day.  busPIRone (BUSPAR) 5 mg tablet Take 5 mg by mouth two (2) times a day.  ASPIRIN PO Take 81 mg by mouth. Enteric coated      MELATONIN-PYRIDOXINE, VIT B6, SL by Aerosolization route as needed. At bedtime      niacin 250 mg tablet Take 250 mg by mouth three (3) times daily (with meals).  fluticasone (FLONASE) 50 mcg/actuation nasal spray 1 [place] by Both Nostrils route two (2) times daily as needed for Rhinitis.  finasteride (PROSCAR) 5 mg tablet Take 5 mg by mouth nightly.  fexofenadine (ALLEGRA) 180 mg tablet Take 180 mg by mouth nightly. Plan:  Obtain authorization for testing from insurance company. Report to follow once testing, scoring, and interpretation completed. ? Organic based neurocognitive issues versus mood disorder or combination of same. ? Problems organic, functional, or both? This note will not be viewable in [address]             Neuropsychological Test Results  Patient Testing 9/21/20 Report Completed 9/22/20  A Psychometrist Assisted w/ portions of this evaluation while under my direct  supervision     The following evaluation procedures/tests were administered:       Neuropsychologist Administered/Interpreted:  Neuropsychological Mental Status Exam, Revised Memory & Behavior Checklist,  Mini Mental Status Exam, Clock Drawing Test, Test Of Premorbid Functioning, Edgardo-Melzack Pain Questionnaire,    LIVAN, CPT-II, WCST, Review Of Available Records.     Psychometrist Administered under Neuropsychologist Supervision & Neuropsychologist Interpreted:  Verbal Fluency Tests, Bronson & Bronson - Revised, Trailmaking Test Parts A & B, Wechsler Adult Intelligence Scale - IV, [place] Verbal Learning Test - 3, Grooved Pegboard, Larkin Depression Inventory - II, Larkin Anxiety Inventory.       Test Findings:  Test Findings:  Note:  The patients raw data have been compared with currently available norms which include demographic corrections for age, gender, and/or education. Sometimes, the patients scores are compared to demographically similar individuals as close to the patients age, education level, etc., as possible. \"Average\" is viewed as being +/- 1 standard deviation (SD) from the stated mean for a particular test score. \"Low average\" is viewed as being between 1 and 2 SD below the mean, and above average is viewed as being 1 and 2 SD above the mean. Scores falling in the borderline range (between 1-1/2 and 2 SD below the mean) are viewed with particular attention as to whether they are normal or abnormal neurocognitive test scores. Other methods of inference in analyzing the test data are also utilized, including the pattern and range of scores in the profile, bilateral motor functions, and the presence, if any, of pathognomonic signs.                               Behaviorally, the patient was friendly and cooperative and appeared motivated to perform well during this examination.   Within this context, the results of this evaluation are viewed as a valid reflection of the patients actual neurocognitive and emotional status.                  His structured word list fluency, as assessed by the FAS Test, was within the above average range with a T score of 56. Category fluency was within the above average range with a T score of 55. Confrontation naming ability, as assessed by the [facility] Test - Revised, was within the above average range at 60/60 correct (T = 72). This pattern of performance is not indicative of a patient who is at increased risk for day-to-day problems with verbal fluency and confrontation naming was also above average. No change from previous testing.                  The patient was administered the [facility] Continuous Performance Test - III,a computer administered test of sustained attention, and review of the subscales within this instrument did not reveal clinically significant concerns for inattentiveness or impulsivity. This pattern of performance is not indicative of a patient who is at increased risk for day-to-day problems with sustained visual attention/concentration. No change from previous testing,  High level auditory information processing speed, as assessed by the PASAT, was normal for both Trials administered.                   The patient is not  showing problems with working memory capacity (63rd  %ile) or  processing speed (91st %ile) on the WAIS-IV. His Verbal Comprehension Index score of 127 (96th  %ile)w as within the superior range. His Perceptual Reasoning Index score of 127 was also within the superior range . These scores do not reflect a decline in functioning based on an assessment of premorbid functioning.      No change from previous testing.                   The patient was administered the [facility] Test  - 3 and generated a normal to above normal range and positive learning curve over five repeated auditory word list learning trials. An interference trial was within the normal range.   Free and cued, short and long delayed recall were all well within or above the normal range. Recognition and forced choice recall were normal.  This pattern of performance is not indicative of a patient who is at increased risk for day-to-day problems with auditory learning and memory. No change from previous testing                   Simple timed visual motor sequencing (Trailmaking Test Part A) was within the above average range with a T score of 72. His performance on a similar, but more complex task of timed visual motor sequencing (Trailmaking Test Part B) was within the above average range with a T score of 37. Taken together, this pattern of performance is not indicative of a patient who is at increased risk for day-to-day problems with executive functioning. Executive functioning subtests on the NAB were also fully within or above the normal range. No change from previous testing                   Fine motor dexterity was within the normal range bilaterally. Motor speed was also normal.  This pattern of performance is not  indicative of a patient who is at increased risk for day-to-day problems with bilateral motor dexterity. No change from previous testing.                   The patient rated his current level of pain as \"1/5 - Mild\" on the Edgardo-Melzack Pain Questionnaire. He reported pain in his knees and abdomen. No change from previous testing,                      His Larkin Depression Inventory- II score of 25 was within the moderately depressed range. He previously reported only minimal depression. His Larkin Anxiety Inventory score of 17 reflected moderate anxiety. This is commensurate with his previous self-report on this measure. The patient was administered the Personality Assessment Inventory and generated a valid profile for interpretation. Within this context, there is very strong support for anxiety and somatization, and PTSD is very likely. There is no support for considerable depression as well.   He is socially isolated, with particular difficulties interpreting the normal nuances of interpersonal behavior that provide meaning to relationships. Notable day-to-day stress and turmoil is reported. Anger management issues are present. He is highly motivate for treatment,but the nature and complexity of some of these issues will make treatment for lengthy, arduous, and with a higher probability of reversals.     Impressions & Recommendations: This patient has now undergone 2 neurocognitive evaluations with respect to neurocognitive functioning abilities. In this regard, comparison of his current test results with previous test results show absolutely 0 decline or change in functioning across any of the multiple neurocognitive domains that were assessed over time. This is very reassuring use. There is no evidence whatsoever of an MCI or dementing type process here. Instead, review of mood related questionnaires and personality functioning reveals ongoing concerns with respect to anxiety and PTSD, ongoing concerns with respect to some matization, and much more significant depression than was previously experienced. This decline in depression is likely a major factor in terms of his observed day-to-day functional memory decline. He continues to have anger management concerns. I believe the pandemic and related issues have taken an emotional toll on him which, in turn, has resulted in cognitive decline. This is not dementia. My recommendations are similar to those provided previously. In this regard, I do recommend active engagement with psychiatry and psychology for management of PTSD, anxiety, and depression related concerns. I hope he is very reassured by the positive nature of the serial neurocognitive test results. Hopefully, an improvement in mood will improve the day-to-day cognitive difficulties he has been experiencing. Stay active mentally, physically, and socially.  I am not concerned about competency, driving, day-to-day supervision, etc.  We now have extensive baseline and updated data on her. Follow up prn. Clinical correlation is, of course, indicated.                   I will discuss these findings with the patient and family when they follow up with me in the near future. A follow up Neuropsychological Evaluation is indicated on a prn basis.       DIAGNOSES: The Referral Dx of MCI - IS NOT SUPPORTED                          Cognitive Complaints with Normal Neuropsychological Evaluation                          PTSD                          Anxiety with Somatic Features                          Moderate Major Depression - Worse over time                    The above information is based upon information currently available to me. If there is any additional information of which I am currently unaware, I would be more than happy to review it upon having it made available to me. Thank you for the opportunity to see this interesting individual.                 Sincerely,                    Clarisse Mari PsyD, EdS        Attachments:  IQ Test Results (In Media Section Of This EMR)     Cc: PCP     Time Documentation:     96116 x 1: Neurobehavioral Status Exam/Clinical Interview: (1 hour, (already billed on first date of service)     96121 x 1: Neurobehavioral Status Exam, Additional: (35 additional minutes, see above)      96138 x 1  96139 x 5 Test Administration/Data Gathering By Technician: (3 hours). 05432 x 1 (first 30 minutes), 91378 x 5 (each additional 30 minutes)     96132 x 1  96133 x 1 Testing Evaluation Services by Neuropsychologist (1 hour, 50 minutes) 96132 x 1 (first hour), 96133 x 1 (50 minutes)     Definitions:       20523/16090:  Neurobehavioral Status Exam, Clinical interview.   Clinical assessment of thinking, reasoning and judgment, by neuropsychologist, both face to face time with patient and time interpreting those test results and reporting, first and subsequent hours)     05428/71044: Neuropsychological Test Administration by Technician/Psychometrist, first 30 minutes and each additional 30 minutes.      The above includes: Record review.  Review of history provided by patient.  Review of collaborative information. Testing by Clinician. Review of raw data. Scoring. Report writing of individual tests administered by Clinician. Integration of individual tests administered by psychometrist with NSE/testing by clinician, review of records/history/collaborative information, case Conceptualization, treatment planning, clinical decision making, report writing, coordination Of Care.  Psychometry test codes as time spent by psychometrist administering and scoring neurocognitive/psychological tests under supervision of neuropsychologist.  Integral services including scoring of raw data, data interpretation, case conceptualization, report writing etcetera were initiated after the patient finished testing/raw data collected and was completed on the date the report was signed.

## 2020-11-03 ENCOUNTER — OFFICE VISIT (OUTPATIENT)
Dept: NEUROLOGY | Age: 70
End: 2020-11-03
Payer: MEDICARE

## 2020-11-03 VITALS — TEMPERATURE: 97.7 F

## 2020-11-03 DIAGNOSIS — F41.9 ANXIETY WITH SOMATIZATION: ICD-10-CM

## 2020-11-03 DIAGNOSIS — F32.1 MODERATE MAJOR DEPRESSION (HCC): ICD-10-CM

## 2020-11-03 DIAGNOSIS — F43.10 PTSD (POST-TRAUMATIC STRESS DISORDER): ICD-10-CM

## 2020-11-03 DIAGNOSIS — R45.4 IRRITABILITY AND ANGER: ICD-10-CM

## 2020-11-03 DIAGNOSIS — Z87.898 HISTORY OF SEIZURES: ICD-10-CM

## 2020-11-03 DIAGNOSIS — Z00.00 NORMAL NEUROPSYCHOLOGICAL EXAM: ICD-10-CM

## 2020-11-03 DIAGNOSIS — F45.0 ANXIETY WITH SOMATIZATION: ICD-10-CM

## 2020-11-03 DIAGNOSIS — G31.84 MILD COGNITIVE IMPAIRMENT: Primary | ICD-10-CM

## 2020-11-03 PROCEDURE — 90832 PSYTX W PT 30 MINUTES: CPT | Performed by: CLINICAL NEUROPSYCHOLOGIST

## 2020-11-03 NOTE — PROGRESS NOTES
Prior to seeing the patient I reviewed the records, including the previously completed report, the records in [place], and any updated visits from other providers since I saw the patient last.      Today, I engaged in a psychoeducational and supportive psychotherapy and feedback session with the patient. I reviewed the results of the recent Neuropsychological Evaluation, including discussing individual tests as well as patient's areas of neurocognitive strength versus weakness. We discussed, in detail, the following: This patient has now undergone 2 neurocognitive evaluations with respect to neurocognitive functioning abilities. In this regard, comparison of his current test results with previous test results show absolutely 0 decline or change in functioning across any of the multiple neurocognitive domains that were assessed over time. This is very reassuring use. There is no evidence whatsoever of an MCI or dementing type process here. Instead, review of mood related questionnaires and personality functioning reveals ongoing concerns with respect to anxiety and PTSD, ongoing concerns with respect to some matization, and much more significant depression than was previously experienced. This decline in depression is likely a major factor in terms of his observed day-to-day functional memory decline. He continues to have anger management concerns. I believe the pandemic and related issues have taken an emotional toll on him which, in turn, has resulted in cognitive decline. This is not dementia.       My recommendations are similar to those provided previously.   In this regard, I do recommend active engagement with psychiatry and psychology for management of PTSD, anxiety, and depression related concerns.  I hope he is very reassured by the positive nature of the serial neurocognitive test results.  Hopefully, an improvement in mood will improve the day-to-day cognitive difficulties he has been experiencing.  Stay active mentally, physically, and socially. I am not concerned about competency, driving, day-to-day supervision, etc. Jennifer Clayton now have extensive baseline and updated data on her.   Follow up prn. Rama Ware correlation is, of course, indicated.                   U will discuss these findings with the patient and family when they follow up with me in the near future.  A follow up Neuropsychological Evaluation is indicated on a prn basis.       DIAGNOSES: The Referral Dx of MCI - IS NOT SUPPORTED                          Cognitive Complaints with Normal Neuropsychological Evaluation                          PTSD                          Anxiety with Somatic Features                          Moderate Major Depression - Worse over time           Education was provided regarding my diagnostic impressions, and we discussed treatment plan/options. I also answered numerous questions related to the clinical findings, including discussing various methods to improve cognition and mood. Counseling provided regarding mood and cognition. CBT and supportive psychotherapy techniques were utilized. Supportive/Cognitive Behavioral/Solution Focused psychotherapy provided  Discussed rational versus irrational thinking patterns and their consequences. Discussed healthy/adaptive and unhealthy/maladaptive coping. The patient needs to    Specific areas which were addressed include:     The patient had the following concerns which I deferred to their referring provider      Time spent today:

## 2022-03-20 PROBLEM — M48.062 SPINAL STENOSIS OF LUMBAR REGION WITH NEUROGENIC CLAUDICATION: Status: ACTIVE | Noted: 2017-07-11

## 2023-05-24 RX ORDER — FEXOFENADINE HCL 180 MG/1
180 TABLET ORAL
COMMUNITY

## 2023-05-24 RX ORDER — FLUTICASONE PROPIONATE 50 MCG
1 SPRAY, SUSPENSION (ML) NASAL 2 TIMES DAILY PRN
COMMUNITY

## 2023-05-24 RX ORDER — NIACIN 250 MG
250 TABLET ORAL
COMMUNITY

## 2023-05-24 RX ORDER — GABAPENTIN 300 MG/1
300 CAPSULE ORAL 3 TIMES DAILY
COMMUNITY

## 2023-05-24 RX ORDER — ATORVASTATIN CALCIUM 40 MG/1
20 TABLET, FILM COATED ORAL NIGHTLY
COMMUNITY

## 2023-05-24 RX ORDER — FINASTERIDE 5 MG/1
5 TABLET, FILM COATED ORAL NIGHTLY
COMMUNITY

## 2023-05-24 RX ORDER — DICLOFENAC SODIUM 75 MG/1
75 TABLET, DELAYED RELEASE ORAL 2 TIMES DAILY
COMMUNITY

## 2023-05-24 RX ORDER — OXYCODONE HYDROCHLORIDE 5 MG/1
5-10 TABLET ORAL
COMMUNITY
Start: 2017-07-11

## 2023-05-24 RX ORDER — BUSPIRONE HYDROCHLORIDE 5 MG/1
5 TABLET ORAL 2 TIMES DAILY
COMMUNITY

## 2023-05-24 RX ORDER — TADALAFIL 5 MG/1
5 TABLET ORAL
COMMUNITY
Start: 2017-07-11

## 2023-05-31 ENCOUNTER — TELEPHONE (OUTPATIENT)
Age: 73
End: 2023-05-31

## 2023-06-12 ENCOUNTER — TELEPHONE (OUTPATIENT)
Age: 73
End: 2023-06-12

## 2023-08-01 ENCOUNTER — HOSPITAL ENCOUNTER (OUTPATIENT)
Facility: HOSPITAL | Age: 73
Discharge: HOME | End: 2023-08-04
Payer: MEDICARE

## 2023-08-01 VITALS
RESPIRATION RATE: 15 BRPM | SYSTOLIC BLOOD PRESSURE: 121 MMHG | HEART RATE: 64 BPM | DIASTOLIC BLOOD PRESSURE: 69 MMHG | WEIGHT: 200 LBS | OXYGEN SATURATION: 95 %

## 2023-08-01 DIAGNOSIS — M47.816 LUMBAR SPONDYLOSIS: ICD-10-CM

## 2023-08-01 DIAGNOSIS — Z98.890 HISTORY OF LUMBAR SURGERY: ICD-10-CM

## 2023-08-01 PROCEDURE — 6360000004 HC RX CONTRAST MEDICATION: Performed by: RADIOLOGY

## 2023-08-01 PROCEDURE — A9579 GAD-BASE MR CONTRAST NOS,1ML: HCPCS | Performed by: RADIOLOGY

## 2023-08-01 PROCEDURE — 6360000002 HC RX W HCPCS: Performed by: STUDENT IN AN ORGANIZED HEALTH CARE EDUCATION/TRAINING PROGRAM

## 2023-08-01 PROCEDURE — 99153 MOD SED SAME PHYS/QHP EA: CPT

## 2023-08-01 PROCEDURE — 99152 MOD SED SAME PHYS/QHP 5/>YRS: CPT

## 2023-08-01 PROCEDURE — 72158 MRI LUMBAR SPINE W/O & W/DYE: CPT

## 2023-08-01 RX ORDER — FENTANYL CITRATE 50 UG/ML
100 INJECTION, SOLUTION INTRAMUSCULAR; INTRAVENOUS
Status: DISCONTINUED | OUTPATIENT
Start: 2023-08-01 | End: 2023-08-05 | Stop reason: HOSPADM

## 2023-08-01 RX ORDER — MIDAZOLAM HYDROCHLORIDE 1 MG/ML
5 INJECTION INTRAMUSCULAR; INTRAVENOUS
Status: DISCONTINUED | OUTPATIENT
Start: 2023-08-01 | End: 2023-08-05 | Stop reason: HOSPADM

## 2023-08-01 RX ADMIN — FENTANYL CITRATE 25 MCG: 50 INJECTION, SOLUTION INTRAMUSCULAR; INTRAVENOUS at 08:13

## 2023-08-01 RX ADMIN — FENTANYL CITRATE 25 MCG: 50 INJECTION, SOLUTION INTRAMUSCULAR; INTRAVENOUS at 08:19

## 2023-08-01 RX ADMIN — GADOTERIDOL 18 ML: 279.3 INJECTION, SOLUTION INTRAVENOUS at 08:42

## 2023-08-01 RX ADMIN — MIDAZOLAM HYDROCHLORIDE 1 MG: 1 INJECTION, SOLUTION INTRAMUSCULAR; INTRAVENOUS at 08:16

## 2023-08-01 RX ADMIN — MIDAZOLAM HYDROCHLORIDE 1 MG: 1 INJECTION, SOLUTION INTRAMUSCULAR; INTRAVENOUS at 08:13

## 2023-08-01 RX ADMIN — FENTANYL CITRATE 25 MCG: 50 INJECTION, SOLUTION INTRAMUSCULAR; INTRAVENOUS at 08:16

## 2023-08-01 RX ADMIN — MIDAZOLAM HYDROCHLORIDE 1 MG: 1 INJECTION, SOLUTION INTRAMUSCULAR; INTRAVENOUS at 08:19

## 2023-08-01 NOTE — DISCHARGE INSTRUCTIONS
Thank you for allowing me to care for you today. Your test results should be resulted in 3-5 days. If you would like to speak with the Radiology Nurse from 7 am to 4 pm please call [phone]. After hours please call the main Radiology dept at [phone] to page the nurse on call.       Thank you again,     Steven Granados RN

## 2023-08-01 NOTE — PROGRESS NOTES
0730 Met patient in lobby. Confirmed , NPO status. 0740 Physical assessment patient denies any distress, VSS, S1, S2, LCTA. IV placed. 8427 Dr Sharren Habermann present at the bedside to consent for MRI of the Lumbar spine with sedation, sedation plan reviewed, all of patient questions were reviewed to satisfaction, all signed consent. 2460 Patient transported [facility] stretcher to [facility], placed supine on the table applied heart monitor, NIBP, SaO2, CO2, with O2 at 3L BNC    Time out 0814  Start time 0816  Stop time 0845    Gave total versed 3 mg and Fentynal 75 mcg. Patient tolerated procedure well. 8099 Patient transported via stretcher to [facility], awake conversing with staff. Taking food and drink with out santa difficulty. Contacted patient river/Ruth gave patient status update and time of discharge. 0930 Patient OOB dressed for discharge, reviewed discharge instructions with patient and /Ruth, gave paper copy. Patient discharge to [facility] via w/c for Malorie Seaman to drive home.     Tres Galan RN

## 2025-01-24 ENCOUNTER — TELEMEDICINE (OUTPATIENT)
Age: 75
End: 2025-01-24

## 2025-01-24 DIAGNOSIS — F43.22 ADJUSTMENT DISORDER WITH ANXIETY: ICD-10-CM

## 2025-01-24 DIAGNOSIS — R45.89 ANXIETY ABOUT HEALTH: ICD-10-CM

## 2025-01-24 DIAGNOSIS — G31.84 MILD COGNITIVE IMPAIRMENT: Primary | ICD-10-CM

## 2025-01-24 NOTE — PROGRESS NOTES
Manuel Norton, was evaluated through a synchronous (real-time) audio-video encounter. The patient (or guardian if applicable) is aware that this is a billable service, which includes applicable co-pays. This Virtual Visit was conducted with patient's (and/or legal guardian's) consent. Patient identification was verified, and a caregiver was present when appropriate.   The patient was located at Home: [facility] [address]  [place] [address]  Provider was located at Facility (Appt Dept): [address]  [address]  [place] [address]  Confirm you are appropriately licensed, registered, or certified to deliver care in the state where the patient is located as indicated above. If you are not or unsure, please re-schedule the visit: Yes, I confirm.     Manuel Norton (:  1950) is a New patient, presenting virtually for evaluation of the following:      Below is the assessment and plan developed based on review of pertinent history, physical exam, labs, studies, and medications.           [facility]  NEUROLOGY CLINIC   [address]   [place], [place] [address] [phone] Office   [phone] Fax      Neuropsychology    Exam # 2    Initial Diagnostic Interview Note      Referral:  David Murrieta MD    Manuel Norton is a 74 y.o. right handed male who was unaccompanied to the initial clinical interview on 2025.  Please refer to his medical records for details pertaining to his history.   At the start of the appointment, I reviewed the patient's [facility] Epic Chart (including Media scanned in from previous providers) for the active Problem List, all pertinent Past Medical Hx, medications, recent radiologic and laboratory findings.  In addition, I reviewed pt's documented Immunization Record and Encounter History.     Chief Complaint: New patient, establish care, for neurocognitive and psychologic

## 2025-01-29 ENCOUNTER — PROCEDURE VISIT (OUTPATIENT)
Age: 75
End: 2025-01-29
Payer: MEDICARE

## 2025-01-29 DIAGNOSIS — G31.84 MILD COGNITIVE IMPAIRMENT: Primary | ICD-10-CM

## 2025-01-29 DIAGNOSIS — F43.21 ADJUSTMENT DISORDER WITH DEPRESSED MOOD: ICD-10-CM

## 2025-01-29 DIAGNOSIS — Z86.59 HISTORY OF POSTTRAUMATIC STRESS DISORDER (PTSD): ICD-10-CM

## 2025-01-29 DIAGNOSIS — F41.8 ANXIETY WITH SOMATIC FEATURES: ICD-10-CM

## 2025-01-29 PROCEDURE — 96132 NRPSYC TST EVAL PHYS/QHP 1ST: CPT | Performed by: CLINICAL NEUROPSYCHOLOGIST

## 2025-01-29 PROCEDURE — 96133 NRPSYC TST EVAL PHYS/QHP EA: CPT | Performed by: CLINICAL NEUROPSYCHOLOGIST

## 2025-01-29 PROCEDURE — 96139 PSYCL/NRPSYC TST TECH EA: CPT | Performed by: CLINICAL NEUROPSYCHOLOGIST

## 2025-01-29 PROCEDURE — 96138 PSYCL/NRPSYC TECH 1ST: CPT | Performed by: CLINICAL NEUROPSYCHOLOGIST

## 2025-01-31 NOTE — PROGRESS NOTES
[facility] NEUROSCIENCE [facility]  NEUROLOGY CLINIC   [address]   [place] [address]   [phone] Office   [phone] Fax      Neuropsychological Evaluation Report    Exam # 3  Referral:  David Murrieta MD    Manuel Norton is a 74 y.o. right handed male who was unaccompanied to the initial clinical interview on 1/24/2025.  Please refer to his medical records for details pertaining to his history.   At the start of the appointment, I reviewed the patient's [facility] Epic Chart (including Media scanned in from previous providers) for the active Problem List, all pertinent Past Medical Hx, medications, recent radiologic and laboratory findings.  In addition, I reviewed pt's documented Immunization Record and Encounter History.     Chief Complaint: Cognitive Decline    When I saw him last in 2019:    Manuel Norton is a 69 y.o. right handed partnered  who was unaccompanied to the initial clinical interview on 7/11/19 .  Please refer to his medical records for details pertaining to his history.  Briefly, the patient reported that he is currently in school working on another degree in interdisciplinary studies (Physics and Math) and completed an engineering degree.  He was a poor student in high school because he would rather do other things.  He works at [employer] as a .  Retired in 2002.  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  He has noticed a decline in cognition.  He has short term memory problems.  Forgets the content of conversations.  He has noticed that his reasoning skills have declined.  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  For the past five years, he is just not quite as sharp.  Today, he was listening to a lecture in mathematics, linear algebra, and was listening to the professor and watching him write on the screen and he couldn't comprehend what was being said.

## 2025-05-09 ENCOUNTER — TELEMEDICINE (OUTPATIENT)
Age: 75
End: 2025-05-09
Payer: MEDICARE

## 2025-05-09 DIAGNOSIS — G31.84 MILD COGNITIVE IMPAIRMENT: Primary | ICD-10-CM

## 2025-05-09 DIAGNOSIS — F43.21 ADJUSTMENT DISORDER WITH DEPRESSED MOOD: ICD-10-CM

## 2025-05-09 DIAGNOSIS — R45.89 ANXIETY ABOUT HEALTH: ICD-10-CM

## 2025-05-09 DIAGNOSIS — Z86.59 HISTORY OF POSTTRAUMATIC STRESS DISORDER (PTSD): ICD-10-CM

## 2025-05-09 DIAGNOSIS — F41.8 ANXIETY WITH SOMATIC FEATURES: ICD-10-CM

## 2025-05-09 PROCEDURE — 96132 NRPSYC TST EVAL PHYS/QHP 1ST: CPT | Performed by: CLINICAL NEUROPSYCHOLOGIST

## 2025-05-09 NOTE — PROGRESS NOTES
mentally, physically, and socially. I am not concerned about competency, driving, day-to-day supervision, etc.  We now have extensive baseline and updated data on him.   Follow up prn.  Clinical correlation is, of course, indicated.                   I will discuss these findings with the patient and family when they follow up with me in the near future.  A follow up Neuropsychological Evaluation is indicated on a prn basis.       DIAGNOSES: The Referral Dx of MCI - IS NOT SUPPORTED                          Cognitive Complaints with Normal Neuropsychological Evaluation                          History of  PTSD                          Anxiety with Somatic Features                          Adjustment Disorder With Depressed Mood   1.We reviewed the findings from the evaluation including test results, diagnosis, and suspected contributing factors  2.Reviewed recommendations outlined in report  3.Answered questions to the best of my ability     The patient needs to:   1.Follow-up with referring provider for ongoing management  2.Follow up with treatment recommendations as outlined   3.Use practical compensatory methods to compensate for cognitive changes  4.Emphasize modifiable protective behaviors for cognitive functioning such as exercise, diet, and cognitive stimulation     Patient's response to recommendations: Patient voiced understanding    Neuropsychological Mental Status Exam (NMSE):  Historian: Good  Praxis: No UE apraxia  R/L Orientation: Intact to self and to other  Dress: within normal limits   Weight: within normal limits   Appearance/Hygiene: within normal limits   Gait: Not assessed  Assistive Devices: Glasses  Mood: within normal limits   Affect: within normal limits   Comprehension: within normal limits   Thought Process: within normal limits   Expressive Language: within normal limits   Receptive Language: within normal limits   Motor:  No cognitive or motor perseveration  ETOH: Occasionally

## 2025-05-29 ENCOUNTER — TELEPHONE (OUTPATIENT)
Age: 75
End: 2025-05-29

## 2025-06-02 NOTE — TELEPHONE ENCOUNTER
Reached out to the patient and schedule a New Patient appointment with Dr. Bermudez. Patient was referred by Dr. VIZCAINO

## 2025-06-10 NOTE — TELEPHONE ENCOUNTER
Patient is calling again. States he has not heard back in regards to scheduling re eval with     Please contact.

## 2025-07-25 ENCOUNTER — OFFICE VISIT (OUTPATIENT)
Age: 75
End: 2025-07-25
Payer: MEDICARE

## 2025-07-25 VITALS
DIASTOLIC BLOOD PRESSURE: 69 MMHG | HEIGHT: 72 IN | SYSTOLIC BLOOD PRESSURE: 103 MMHG | BODY MASS INDEX: 25.87 KG/M2 | RESPIRATION RATE: 16 BRPM | HEART RATE: 64 BPM | WEIGHT: 191 LBS | OXYGEN SATURATION: 97 %

## 2025-07-25 DIAGNOSIS — R41.3 FUNCTIONAL MEMORY PROBLEM: Primary | ICD-10-CM

## 2025-07-25 PROCEDURE — 3017F COLORECTAL CA SCREEN DOC REV: CPT | Performed by: PSYCHIATRY & NEUROLOGY

## 2025-07-25 PROCEDURE — 1123F ACP DISCUSS/DSCN MKR DOCD: CPT | Performed by: PSYCHIATRY & NEUROLOGY

## 2025-07-25 PROCEDURE — 1036F TOBACCO NON-USER: CPT | Performed by: PSYCHIATRY & NEUROLOGY

## 2025-07-25 PROCEDURE — G8419 CALC BMI OUT NRM PARAM NOF/U: HCPCS | Performed by: PSYCHIATRY & NEUROLOGY

## 2025-07-25 PROCEDURE — G8428 CUR MEDS NOT DOCUMENT: HCPCS | Performed by: PSYCHIATRY & NEUROLOGY

## 2025-07-25 PROCEDURE — 99203 OFFICE O/P NEW LOW 30 MIN: CPT | Performed by: PSYCHIATRY & NEUROLOGY

## 2025-07-25 PROCEDURE — 1126F AMNT PAIN NOTED NONE PRSNT: CPT | Performed by: PSYCHIATRY & NEUROLOGY

## 2025-07-25 RX ORDER — DONEPEZIL HYDROCHLORIDE 5 MG/1
5 TABLET, FILM COATED ORAL NIGHTLY
COMMUNITY
Start: 2025-06-19 | End: 2026-06-19

## 2025-07-25 RX ORDER — TRAZODONE HYDROCHLORIDE 50 MG/1
50 TABLET ORAL NIGHTLY
COMMUNITY

## 2025-07-25 RX ORDER — PRIMIDONE 50 MG/1
150 TABLET ORAL NIGHTLY
COMMUNITY
Start: 2024-12-11

## 2025-07-25 NOTE — PROGRESS NOTES
[facility] at [facility] Neurology Clinics at [address] [place] [address] [place] [id number] [address] [place] [address] [phone] Office  [phone] Facsimile           Referring:     Chief Complaint   Patient presents with    Other     Had epilepsy as a child at age 22 temporal lobe epilepsy.  Memory has slowing decline. Came to Dr. Bermudez that cognitive noise may be a cause to memory loss.      History of Present Illness  The patient is a 75-year-old gentleman who presents for neurologic consultation regarding perceived cognitive decline.    He has been evaluated 3 times by Dr. Fried due to what he perceives to be a significant decrease in his cognitive abilities. Despite being informed that his condition is normal, and he has had again 3 distinct objective neurocognitive evaluations, he perceives a decline. Currently, he is preparing to apply for graduate school and is concerned about his ability to perform at his best. He reports difficulties with memory and focus, which he believes are hindering his potential. He is curious if his cognitive decline could be linked to a resurgence. He has a history of temporal lobe epilepsy, which was diagnosed during his college years through an EEG. His last seizure occurred when he was 27 years old.  Despite having these evaluations he continues to think that his memory is poor and questions whether this could be related to his previous diagnosis of epilepsy    Dr. Ray, [facility] neurology, where he was seen June 11, 2025 for essential tremor and primidone was being titrated.    Dr. Fried neurocognitive evaluation January 29, 2025 which demonstrated a profile that demonstrated no major decline or change in functioning across multiple neurocognitive domains compared to his previous 2 tests.  He noted there remains no evidence whatsoever of a mild

## 2025-07-25 NOTE — PROGRESS NOTES
Chief Complaint   Patient presents with    Other     Had epilepsy as a child at age 22 temporal lobe epilepsy.  Memory has slowing decline. Came to Dr. eBrmudez that cognitive noise may be a cause to memory loss.      Vitals:    07/25/25 0811   BP: 103/69   Pulse: 64   Resp: 16   SpO2: 97%

## (undated) DEVICE — SYSTEM SKIN CLSR 22CM DERMBND PRINEO

## (undated) DEVICE — SOLUTION IV 1000ML 0.9% SOD CHL

## (undated) DEVICE — 3M™ BAIR HUGGER® UNDERBODY BLANKET, SPINAL, 10 PER CASE 57501: Brand: BAIR HUGGER™

## (undated) DEVICE — HANDLE LT SNAP ON ULT DURABLE LENS FOR TRUMPF ALC DISPOSABLE

## (undated) DEVICE — DRAPE,LAPAROTOMY,PCH,STERILE: Brand: MEDLINE

## (undated) DEVICE — TOWEL SURG W17XL27IN STD BLU COT NONFENESTRATED PREWASHED

## (undated) DEVICE — GAUZE SPONGES,12 PLY: Brand: CURITY

## (undated) DEVICE — LAMINECTOMY RICHMOND-LF: Brand: MEDLINE INDUSTRIES, INC.

## (undated) DEVICE — (D)PREP SKN CHLRAPRP APPL 26ML -- CONVERT TO ITEM 371833

## (undated) DEVICE — COVER,MAYO STAND,STERILE: Brand: MEDLINE

## (undated) DEVICE — 3M™ STERI-DRAPE™ INSTRUMENT POUCH 1018: Brand: STERI-DRAPE™

## (undated) DEVICE — SOLUTION IRRIG 1000ML H2O STRL BLT

## (undated) DEVICE — SUTURE V-LOC 180 SZ 0 L12IN ABSRB GRN L37MM GS-21 1/2 CIR VLOCL0316

## (undated) DEVICE — STAPLER SKIN 35CT WD STRL DISP -- MULTIFIRE PREMIUM

## (undated) DEVICE — MEDI-VAC NON-CONDUCTIVE SUCTION TUBING: Brand: CARDINAL HEALTH

## (undated) DEVICE — TUBING IRRIG L77IN DIA0.241IN L BOR FOR CYSTO W/ NVENT

## (undated) DEVICE — STERILE POLYISOPRENE POWDER-FREE SURGICAL GLOVES: Brand: PROTEXIS

## (undated) DEVICE — STERILE POLYISOPRENE POWDER-FREE SURGICAL GLOVES WITH EMOLLIENT COATING: Brand: PROTEXIS

## (undated) DEVICE — SUTURE ABSORBABLE BRAIDED 2-0 CT-1 27 IN UD VICRYL J259H

## (undated) DEVICE — TOOL 14MH30 LEGEND 14CM 3MM: Brand: MIDAS REX ™

## (undated) DEVICE — SOLUTION IRRIG 3000ML 0.9% SOD CHL FLX CONT 0797208] ICU MEDICAL INC]

## (undated) DEVICE — BIPOLAR FORCEPS CORD,BANANA LEADS: Brand: VALLEYLAB

## (undated) DEVICE — SYR 50ML LR LCK 1ML GRAD NSAF --

## (undated) DEVICE — BONE WAX WHITE: Brand: BONE WAX WHITE

## (undated) DEVICE — KIT JACK TBL PT CARE

## (undated) DEVICE — KENDALL SCD EXPRESS SLEEVES, KNEE LENGTH, MEDIUM: Brand: KENDALL SCD

## (undated) DEVICE — GOWN,SIRUS,NONRNF,SETINSLV,2XL,18/CS: Brand: MEDLINE

## (undated) DEVICE — SUTURE VCRL SZ 1 L18IN ABSRB VLT CT-1 L36MM 1/2 CIR J741D

## (undated) DEVICE — SUTURE STRATAFIX SPRL MCRYL + SZ 2-0 L18IN ABSRB UD CT-1 SXMP1B413

## (undated) DEVICE — INFECTION CONTROL KIT SYS

## (undated) DEVICE — NEEDLE HYPO 18GA L1.5IN PNK S STL HUB POLYPR SHLD REG BVL

## (undated) DEVICE — NDL SPNE QNCKE 18GX3.5IN LF --

## (undated) DEVICE — FLOSEAL MATRIX IS INDICATED IN SURGICAL PROCEDURES (OTHER THAN IN OPHTHALMIC) AS AN ADJUNCT TO HEMOSTASIS WHEN CONTROL OF BLEEDING BY LIGATURE OR CONVENTIONAL PROCEDURES IS INEFFECTIVE OR IMPRACTICAL.: Brand: FLOSEAL HEMOSTATIC MATRIX

## (undated) DEVICE — TRAY CATH 16F DRN BG LTX -- CONVERT TO ITEM 363158

## (undated) DEVICE — Device

## (undated) DEVICE — SKIN MARKER,REGULAR TIP WITH RULER AND LABELS: Brand: DEVON

## (undated) DEVICE — 1200 GUARD II KIT W/5MM TUBE W/O VAC TUBE: Brand: GUARDIAN

## (undated) DEVICE — Z CONVERTED USE 2107985 COVER FLROSCP W36XL28IN 4 SIDE ADH

## (undated) DEVICE — BLADE ES L4IN INSUL EDGE